# Patient Record
Sex: FEMALE | Race: OTHER | Employment: UNEMPLOYED | URBAN - METROPOLITAN AREA
[De-identification: names, ages, dates, MRNs, and addresses within clinical notes are randomized per-mention and may not be internally consistent; named-entity substitution may affect disease eponyms.]

---

## 2018-11-21 ENCOUNTER — HOSPITAL ENCOUNTER (OUTPATIENT)
Dept: LAB | Facility: MEDICAL CENTER | Age: 58
End: 2018-11-21
Attending: FAMILY MEDICINE
Payer: COMMERCIAL

## 2018-11-21 LAB
ALBUMIN SERPL BCP-MCNC: 3.8 G/DL (ref 3.2–4.9)
ALBUMIN/GLOB SERPL: 1 G/DL
ALP SERPL-CCNC: 83 U/L (ref 30–99)
ALT SERPL-CCNC: 16 U/L (ref 2–50)
ANION GAP SERPL CALC-SCNC: 9 MMOL/L (ref 0–11.9)
ANISOCYTOSIS BLD QL SMEAR: ABNORMAL
AST SERPL-CCNC: 16 U/L (ref 12–45)
BASOPHILS # BLD AUTO: 1.4 % (ref 0–1.8)
BASOPHILS # BLD: 0.08 K/UL (ref 0–0.12)
BILIRUB SERPL-MCNC: 0.3 MG/DL (ref 0.1–1.5)
BUN SERPL-MCNC: 9 MG/DL (ref 8–22)
CALCIUM SERPL-MCNC: 9.6 MG/DL (ref 8.5–10.5)
CHLORIDE SERPL-SCNC: 104 MMOL/L (ref 96–112)
CHOLEST SERPL-MCNC: 188 MG/DL (ref 100–199)
CO2 SERPL-SCNC: 24 MMOL/L (ref 20–33)
COMMENT 1642: NORMAL
CREAT SERPL-MCNC: 0.65 MG/DL (ref 0.5–1.4)
EOSINOPHIL # BLD AUTO: 0.25 K/UL (ref 0–0.51)
EOSINOPHIL NFR BLD: 4.4 % (ref 0–6.9)
ERYTHROCYTE [DISTWIDTH] IN BLOOD BY AUTOMATED COUNT: 50 FL (ref 35.9–50)
FASTING STATUS PATIENT QL REPORTED: NORMAL
GLOBULIN SER CALC-MCNC: 3.7 G/DL (ref 1.9–3.5)
GLUCOSE SERPL-MCNC: 79 MG/DL (ref 65–99)
HCT VFR BLD AUTO: 30.3 % (ref 37–47)
HDLC SERPL-MCNC: 52 MG/DL
HGB BLD-MCNC: 8.5 G/DL (ref 12–16)
HYPOCHROMIA BLD QL SMEAR: ABNORMAL
IMM GRANULOCYTES # BLD AUTO: 0.02 K/UL (ref 0–0.11)
IMM GRANULOCYTES NFR BLD AUTO: 0.3 % (ref 0–0.9)
LDLC SERPL CALC-MCNC: 111 MG/DL
LYMPHOCYTES # BLD AUTO: 1.17 K/UL (ref 1–4.8)
LYMPHOCYTES NFR BLD: 20.4 % (ref 22–41)
MCH RBC QN AUTO: 19.5 PG (ref 27–33)
MCHC RBC AUTO-ENTMCNC: 28.1 G/DL (ref 33.6–35)
MCV RBC AUTO: 69.7 FL (ref 81.4–97.8)
MICROCYTES BLD QL SMEAR: ABNORMAL
MONOCYTES # BLD AUTO: 0.45 K/UL (ref 0–0.85)
MONOCYTES NFR BLD AUTO: 7.9 % (ref 0–13.4)
MORPHOLOGY BLD-IMP: NORMAL
NEUTROPHILS # BLD AUTO: 3.76 K/UL (ref 2–7.15)
NEUTROPHILS NFR BLD: 65.6 % (ref 44–72)
NRBC # BLD AUTO: 0 K/UL
NRBC BLD-RTO: 0 /100 WBC
PLATELET # BLD AUTO: 536 K/UL (ref 164–446)
PLATELET BLD QL SMEAR: NORMAL
PMV BLD AUTO: 9.9 FL (ref 9–12.9)
POIKILOCYTOSIS BLD QL SMEAR: NORMAL
POTASSIUM SERPL-SCNC: 4 MMOL/L (ref 3.6–5.5)
PROT SERPL-MCNC: 7.5 G/DL (ref 6–8.2)
RBC # BLD AUTO: 4.35 M/UL (ref 4.2–5.4)
RBC BLD AUTO: PRESENT
SODIUM SERPL-SCNC: 137 MMOL/L (ref 135–145)
TARGETS BLD QL SMEAR: NORMAL
TRIGL SERPL-MCNC: 126 MG/DL (ref 0–149)
TSH SERPL DL<=0.005 MIU/L-ACNC: 5.44 UIU/ML (ref 0.38–5.33)
WBC # BLD AUTO: 5.7 K/UL (ref 4.8–10.8)

## 2018-11-21 PROCEDURE — 85025 COMPLETE CBC W/AUTO DIFF WBC: CPT

## 2018-11-21 PROCEDURE — 36415 COLL VENOUS BLD VENIPUNCTURE: CPT

## 2018-11-21 PROCEDURE — 84443 ASSAY THYROID STIM HORMONE: CPT

## 2018-11-21 PROCEDURE — 80053 COMPREHEN METABOLIC PANEL: CPT

## 2018-11-21 PROCEDURE — 80061 LIPID PANEL: CPT

## 2018-12-03 ENCOUNTER — HOSPITAL ENCOUNTER (OUTPATIENT)
Dept: LAB | Facility: MEDICAL CENTER | Age: 58
End: 2018-12-03
Attending: FAMILY MEDICINE
Payer: COMMERCIAL

## 2018-12-03 LAB
HGB RETIC QN AUTO: 19.2 PG/CELL (ref 29–35)
IMM RETICS NFR: 26.8 % (ref 9.3–17.4)
RETICS # AUTO: 0.07 M/UL (ref 0.04–0.06)
RETICS/RBC NFR: 1.6 % (ref 0.8–2.1)

## 2018-12-03 PROCEDURE — 83550 IRON BINDING TEST: CPT

## 2018-12-03 PROCEDURE — 85046 RETICYTE/HGB CONCENTRATE: CPT

## 2018-12-03 PROCEDURE — 36415 COLL VENOUS BLD VENIPUNCTURE: CPT

## 2018-12-03 PROCEDURE — 83540 ASSAY OF IRON: CPT

## 2018-12-04 LAB
IRON SATN MFR SERPL: 3 % (ref 15–55)
IRON SERPL-MCNC: 17 UG/DL (ref 40–170)
TIBC SERPL-MCNC: 487 UG/DL (ref 250–450)

## 2019-01-03 ENCOUNTER — HOSPITAL ENCOUNTER (OUTPATIENT)
Dept: LAB | Facility: MEDICAL CENTER | Age: 59
End: 2019-01-03
Attending: NURSE PRACTITIONER
Payer: COMMERCIAL

## 2019-01-03 LAB
ANISOCYTOSIS BLD QL SMEAR: ABNORMAL
BASOPHILS # BLD AUTO: 0.5 % (ref 0–1.8)
BASOPHILS # BLD: 0.04 K/UL (ref 0–0.12)
COMMENT 1642: NORMAL
EOSINOPHIL # BLD AUTO: 0.02 K/UL (ref 0–0.51)
EOSINOPHIL NFR BLD: 0.2 % (ref 0–6.9)
ERYTHROCYTE [DISTWIDTH] IN BLOOD BY AUTOMATED COUNT: 62.6 FL (ref 35.9–50)
HCT VFR BLD AUTO: 34.2 % (ref 37–47)
HGB BLD-MCNC: 10.1 G/DL (ref 12–16)
HYPOCHROMIA BLD QL SMEAR: ABNORMAL
IMM GRANULOCYTES # BLD AUTO: 0.01 K/UL (ref 0–0.11)
IMM GRANULOCYTES NFR BLD AUTO: 0.1 % (ref 0–0.9)
LG PLATELETS BLD QL SMEAR: NORMAL
LYMPHOCYTES # BLD AUTO: 1.19 K/UL (ref 1–4.8)
LYMPHOCYTES NFR BLD: 14.1 % (ref 22–41)
MCH RBC QN AUTO: 22.1 PG (ref 27–33)
MCHC RBC AUTO-ENTMCNC: 29.5 G/DL (ref 33.6–35)
MCV RBC AUTO: 75 FL (ref 81.4–97.8)
MICROCYTES BLD QL SMEAR: ABNORMAL
MONOCYTES # BLD AUTO: 0.25 K/UL (ref 0–0.85)
MONOCYTES NFR BLD AUTO: 3 % (ref 0–13.4)
MORPHOLOGY BLD-IMP: NORMAL
NEUTROPHILS # BLD AUTO: 6.93 K/UL (ref 2–7.15)
NEUTROPHILS NFR BLD: 82.1 % (ref 44–72)
NRBC # BLD AUTO: 0 K/UL
NRBC BLD-RTO: 0 /100 WBC
PLATELET # BLD AUTO: 427 K/UL (ref 164–446)
PLATELET BLD QL SMEAR: NORMAL
PMV BLD AUTO: 10.7 FL (ref 9–12.9)
RBC # BLD AUTO: 4.56 M/UL (ref 4.2–5.4)
RBC BLD AUTO: PRESENT
WBC # BLD AUTO: 8.4 K/UL (ref 4.8–10.8)

## 2019-01-03 PROCEDURE — 85025 COMPLETE CBC W/AUTO DIFF WBC: CPT

## 2019-01-03 PROCEDURE — 36415 COLL VENOUS BLD VENIPUNCTURE: CPT

## 2019-04-11 ENCOUNTER — OFFICE VISIT (OUTPATIENT)
Dept: URGENT CARE | Facility: PHYSICIAN GROUP | Age: 59
End: 2019-04-11
Payer: COMMERCIAL

## 2019-04-11 VITALS
DIASTOLIC BLOOD PRESSURE: 100 MMHG | TEMPERATURE: 100.3 F | HEART RATE: 102 BPM | WEIGHT: 160 LBS | BODY MASS INDEX: 30.23 KG/M2 | RESPIRATION RATE: 16 BRPM | SYSTOLIC BLOOD PRESSURE: 170 MMHG | OXYGEN SATURATION: 97 %

## 2019-04-11 DIAGNOSIS — T78.40XA ALLERGIC REACTION, INITIAL ENCOUNTER: ICD-10-CM

## 2019-04-11 DIAGNOSIS — R21 RASH: ICD-10-CM

## 2019-04-11 PROCEDURE — 99203 OFFICE O/P NEW LOW 30 MIN: CPT | Performed by: PHYSICIAN ASSISTANT

## 2019-04-11 RX ORDER — PREDNISONE 20 MG/1
20 TABLET ORAL 3 TIMES DAILY
Qty: 9 TAB | Refills: 0 | Status: SHIPPED | OUTPATIENT
Start: 2019-04-11 | End: 2019-04-14

## 2019-04-11 RX ORDER — ESOMEPRAZOLE MAGNESIUM 40 MG/1
40 CAPSULE, DELAYED RELEASE ORAL
Qty: 30 CAP | Refills: 1 | Status: SHIPPED | OUTPATIENT
Start: 2019-04-11 | End: 2021-06-11

## 2019-04-11 RX ORDER — DIPHENHYDRAMINE HCL 25 MG
25 CAPSULE ORAL ONCE
Status: COMPLETED | OUTPATIENT
Start: 2019-04-11 | End: 2019-04-11

## 2019-04-11 RX ORDER — LISINOPRIL 20 MG/1
20 TABLET ORAL DAILY
COMMUNITY
End: 2021-06-24

## 2019-04-11 RX ORDER — PREGABALIN 75 MG/1
75 CAPSULE ORAL 3 TIMES DAILY
COMMUNITY
End: 2021-06-24

## 2019-04-11 RX ORDER — LEVOTHYROXINE SODIUM 0.03 MG/1
25 TABLET ORAL
COMMUNITY

## 2019-04-11 RX ORDER — PREDNISONE 20 MG/1
20 TABLET ORAL ONCE
Status: COMPLETED | OUTPATIENT
Start: 2019-04-11 | End: 2019-04-11

## 2019-04-11 RX ADMIN — PREDNISONE 20 MG: 20 TABLET ORAL at 18:50

## 2019-04-11 RX ADMIN — Medication 25 MG: at 18:50

## 2019-04-12 NOTE — PATIENT INSTRUCTIONS
Take medications for hives as follows:    Prednisone 20mg tabs: take one tab by mouth 3 times a day x 3 days.     Benadryl 25mg tabs: take 1-2 tabs by mouth 3 times a day x 3 days.      Zyrtec (cetirazine) : take 1 tab daily x 3 days

## 2019-04-12 NOTE — PROGRESS NOTES
Subjective:      Sushma Kumari Chawla is a 58 y.o. female who presents with Facial Swelling (constipation  ate pizza then got heartburn,next day got a fever and sweling oin face,headache,lightheaded,today rest of face swelled up)    PMH:  has a past medical history of Arthritis; GERD (gastroesophageal reflux disease); and Hypertension.  MEDS:   Current Outpatient Prescriptions:   •  levothyroxine (SYNTHROID) 25 MCG Tab, Take 25 mcg by mouth Every morning on an empty stomach., Disp: , Rfl:   •  lisinopril (PRINIVIL) 20 MG Tab, Take 20 mg by mouth every day., Disp: , Rfl:   •  esomeprazole (NEXIUM) 20 MG capsule, Take 20 mg by mouth every morning before breakfast., Disp: , Rfl:   •  pregabalin (LYRICA) 75 MG Cap, Take 75 mg by mouth 3 times a day., Disp: , Rfl:   •  esomeprazole (NEXIUM) 40 MG delayed-release capsule, Take 1 Cap by mouth every morning before breakfast., Disp: 30 Cap, Rfl: 1  •  pantoprazole (PROTONIX) 40 MG PACK, 40 mg by Nasogastric route every day., Disp: , Rfl:   •  verapamil (COVERA HS) 180 MG (CO) CR tablet, Take 180 mg by mouth every day., Disp: , Rfl:   •  famotidine (PEPCID) 40 MG TABS, Take 40 mg by mouth 2 times a day., Disp: , Rfl:   •  hydroxychloroquine (PLAQUENIL) 200 MG TABS, Take 400 mg by mouth every day., Disp: , Rfl:   ALLERGIES:   Allergies   Allergen Reactions   • Atenolol    • Darvon [Propoxyphene Hcl]      SURGHX: History reviewed. No pertinent surgical history.  SOCHX:  reports that she has never smoked. She has never used smokeless tobacco. She reports that she does not drink alcohol or use drugs.  FH: Reviewed with patient, not pertinent to this visit.           Patient presents with:  Facial Swellin ate pizza then got heartburn,next day got a fever and swelling oin face,headache,lightheaded,today rest of face swelled up. Pt denies shortness of breath, wheezing, swelling to tongue or throat.  Pt has never had reaction to pizza before but cant think of any other  reason for rash.  Pt has taken some otc benadryl with some relief prior to arrival.           Rash   This is a new problem. Episode onset: 2 days. The problem has been waxing and waning since onset. The affected locations include the face and neck. The rash is characterized by burning, itchiness, redness and swelling. Associated with: unsure  Pertinent negatives include no congestion, cough, fever, rhinorrhea or shortness of breath. Past treatments include antihistamine. The treatment provided mild relief. There is no history of eczema.       Review of Systems   Constitutional: Negative for chills and fever.   HENT: Negative for congestion and rhinorrhea.    Respiratory: Negative for cough, shortness of breath, wheezing and stridor.    Skin: Positive for rash.   All other systems reviewed and are negative.         Objective:     There were no vitals taken for this visit.     Physical Exam   Constitutional: She is oriented to person, place, and time. She appears well-developed and well-nourished. No distress.   HENT:   Head: Normocephalic.       Nose: Nose normal.   Mouth/Throat: Oropharynx is clear and moist.   Eyes: Pupils are equal, round, and reactive to light. Conjunctivae and EOM are normal.   Neck: Normal range of motion. Neck supple.   Cardiovascular: Normal rate, regular rhythm and normal heart sounds.    Pulmonary/Chest: Effort normal and breath sounds normal.   Musculoskeletal: Normal range of motion.   Neurological: She is alert and oriented to person, place, and time. Gait normal.   Skin: Skin is warm and dry. Capillary refill takes less than 2 seconds. Rash noted.   Psychiatric: She has a normal mood and affect.   Nursing note and vitals reviewed.              Assessment/Plan:     1. Rash  esomeprazole (NEXIUM) 40 MG delayed-release capsule    predniSONE (DELTASONE) 20 MG Tab    diphenhydrAMINE (BENADRYL) capsule 25 mg    predniSONE (DELTASONE) tablet 20 mg   2. Allergic reaction, initial encounter   esomeprazole (NEXIUM) 40 MG delayed-release capsule    predniSONE (DELTASONE) 20 MG Tab    diphenhydrAMINE (BENADRYL) capsule 25 mg    predniSONE (DELTASONE) tablet 20 mg     Pt encouraged to continue otc medications until visit with pcp in 3 days.     PT to continue taking prescription medications as prescribed.      PT should follow up with PCP in 1-2 days for re-evaluation if symptoms have not improved.  Discussed red flags and reasons to return to UC or ED.  Pt and/or family verbalized understanding of diagnosis and follow up instructions and was offered informational handout on diagnosis.  PT discharged.

## 2019-04-16 ASSESSMENT — ENCOUNTER SYMPTOMS
SHORTNESS OF BREATH: 0
WHEEZING: 0
STRIDOR: 0
CHILLS: 0
RHINORRHEA: 0
COUGH: 0
FEVER: 0

## 2020-09-19 ENCOUNTER — HOSPITAL ENCOUNTER (OUTPATIENT)
Dept: LAB | Facility: MEDICAL CENTER | Age: 60
End: 2020-09-19
Attending: FAMILY MEDICINE
Payer: COMMERCIAL

## 2020-09-19 LAB
ALBUMIN SERPL BCP-MCNC: 3.9 G/DL (ref 3.2–4.9)
ALBUMIN/GLOB SERPL: 1.2 G/DL
ALP SERPL-CCNC: 110 U/L (ref 30–99)
ALT SERPL-CCNC: 32 U/L (ref 2–50)
ANION GAP SERPL CALC-SCNC: 13 MMOL/L (ref 7–16)
AST SERPL-CCNC: 31 U/L (ref 12–45)
BILIRUB SERPL-MCNC: 0.2 MG/DL (ref 0.1–1.5)
BUN SERPL-MCNC: 10 MG/DL (ref 8–22)
CALCIUM SERPL-MCNC: 9.6 MG/DL (ref 8.5–10.5)
CHLORIDE SERPL-SCNC: 100 MMOL/L (ref 96–112)
CHOLEST SERPL-MCNC: 220 MG/DL (ref 100–199)
CO2 SERPL-SCNC: 22 MMOL/L (ref 20–33)
CREAT SERPL-MCNC: 0.68 MG/DL (ref 0.5–1.4)
GLOBULIN SER CALC-MCNC: 3.2 G/DL (ref 1.9–3.5)
GLUCOSE SERPL-MCNC: 100 MG/DL (ref 65–99)
HDLC SERPL-MCNC: 45 MG/DL
LDLC SERPL CALC-MCNC: 135 MG/DL
POTASSIUM SERPL-SCNC: 3.8 MMOL/L (ref 3.6–5.5)
PROT SERPL-MCNC: 7.1 G/DL (ref 6–8.2)
SODIUM SERPL-SCNC: 135 MMOL/L (ref 135–145)
T4 FREE SERPL-MCNC: 1.2 NG/DL (ref 0.93–1.7)
TRIGL SERPL-MCNC: 200 MG/DL (ref 0–149)
TSH SERPL DL<=0.005 MIU/L-ACNC: 5.44 UIU/ML (ref 0.38–5.33)

## 2020-09-19 PROCEDURE — 36415 COLL VENOUS BLD VENIPUNCTURE: CPT

## 2020-09-19 PROCEDURE — 80061 LIPID PANEL: CPT

## 2020-09-19 PROCEDURE — 84439 ASSAY OF FREE THYROXINE: CPT

## 2020-09-19 PROCEDURE — 80053 COMPREHEN METABOLIC PANEL: CPT

## 2020-09-19 PROCEDURE — 84443 ASSAY THYROID STIM HORMONE: CPT

## 2021-03-15 DIAGNOSIS — Z23 NEED FOR VACCINATION: ICD-10-CM

## 2021-06-11 ENCOUNTER — OFFICE VISIT (OUTPATIENT)
Dept: URGENT CARE | Facility: PHYSICIAN GROUP | Age: 61
End: 2021-06-11

## 2021-06-11 VITALS
TEMPERATURE: 99 F | BODY MASS INDEX: 30.4 KG/M2 | RESPIRATION RATE: 18 BRPM | OXYGEN SATURATION: 100 % | SYSTOLIC BLOOD PRESSURE: 182 MMHG | WEIGHT: 161 LBS | HEIGHT: 61 IN | HEART RATE: 88 BPM | DIASTOLIC BLOOD PRESSURE: 82 MMHG

## 2021-06-11 DIAGNOSIS — R11.2 NAUSEA AND VOMITING, INTRACTABILITY OF VOMITING NOT SPECIFIED, UNSPECIFIED VOMITING TYPE: ICD-10-CM

## 2021-06-11 DIAGNOSIS — R03.0 ELEVATED BLOOD PRESSURE READING: Primary | ICD-10-CM

## 2021-06-11 DIAGNOSIS — K52.9 GASTROENTERITIS: ICD-10-CM

## 2021-06-11 PROCEDURE — 99214 OFFICE O/P EST MOD 30 MIN: CPT | Performed by: PHYSICIAN ASSISTANT

## 2021-06-11 RX ORDER — ONDANSETRON 4 MG/1
4 TABLET, FILM COATED ORAL EVERY 4 HOURS PRN
Qty: 20 TABLET | Refills: 0 | Status: SHIPPED | OUTPATIENT
Start: 2021-06-11 | End: 2021-06-16

## 2021-06-11 RX ORDER — ONDANSETRON 4 MG/1
4 TABLET, ORALLY DISINTEGRATING ORAL ONCE
Status: COMPLETED | OUTPATIENT
Start: 2021-06-11 | End: 2021-06-11

## 2021-06-11 RX ADMIN — ONDANSETRON 4 MG: 4 TABLET, ORALLY DISINTEGRATING ORAL at 18:20

## 2021-06-11 ASSESSMENT — ENCOUNTER SYMPTOMS
DIARRHEA: 0
VOMITING: 1
MYALGIAS: 0
FEVER: 0
ABDOMINAL PAIN: 1
CONSTIPATION: 1

## 2021-06-11 ASSESSMENT — CROHNS DISEASE ACTIVITY INDEX (CDAI): CDAI SCORE: 0

## 2021-06-12 NOTE — PROGRESS NOTES
Subjective:   Sushma Kumari Chawla is a 60 y.o. female who presents for Abdominal Pain (vomiting, cant keep anything down. abdominal pain. )        Abdominal Pain  This is a new problem. The current episode started yesterday. The onset quality is sudden. The problem occurs constantly. The pain is located in the epigastric region. The quality of the pain is burning. The abdominal pain does not radiate. Associated symptoms include constipation and vomiting (x 10 episodes ). Pertinent negatives include no diarrhea, fever, melena or myalgias. Treatments tried: peptobismol  Her past medical history is significant for GERD. There is no history of abdominal surgery, Crohn's disease, pancreatitis or ulcerative colitis.       No ill contacts. No recent abx, hospitalizations, travel. No suspicious food intake.    Review of Systems   Constitutional: Negative for fever.   Gastrointestinal: Positive for abdominal pain, constipation and vomiting (x 10 episodes ). Negative for diarrhea and melena.   Musculoskeletal: Negative for myalgias.       PMH:  has a past medical history of Arthritis, GERD (gastroesophageal reflux disease), and Hypertension.  MEDS:   Current Outpatient Medications:   •  methotrexate 2.5 MG Tab, Take 7.5 mg by mouth every 7 days., Disp: , Rfl:   •  ondansetron (ZOFRAN) 4 MG Tab tablet, Take 1 tablet by mouth every four hours as needed for Nausea/Vomiting for up to 5 days., Disp: 20 tablet, Rfl: 0  •  levothyroxine (SYNTHROID) 25 MCG Tab, Take 25 mcg by mouth Every morning on an empty stomach., Disp: , Rfl:   •  lisinopril (PRINIVIL) 20 MG Tab, Take 20 mg by mouth every day., Disp: , Rfl:   •  esomeprazole (NEXIUM) 20 MG capsule, Take 20 mg by mouth every morning before breakfast., Disp: , Rfl:   •  pregabalin (LYRICA) 75 MG Cap, Take 75 mg by mouth 3 times a day., Disp: , Rfl:   •  verapamil (COVERA HS) 180 MG (CO) CR tablet, Take 180 mg by mouth every day., Disp: , Rfl:   •  famotidine (PEPCID) 40 MG TABS,  "Take 40 mg by mouth 2 times a day., Disp: , Rfl:   •  hydroxychloroquine (PLAQUENIL) 200 MG TABS, Take 400 mg by mouth every day., Disp: , Rfl:   ALLERGIES:   Allergies   Allergen Reactions   • Atenolol    • Darvon [Propoxyphene Hcl]      SURGHX: History reviewed. No pertinent surgical history.  SOCHX:  reports that she has never smoked. She has never used smokeless tobacco. She reports that she does not drink alcohol and does not use drugs.  History reviewed. No pertinent family history.     Objective:   BP (!) 182/82   Pulse 88   Temp 37.2 °C (99 °F) (Temporal)   Resp 18   Ht 1.549 m (5' 1\")   Wt 73 kg (161 lb)   SpO2 100%   BMI 30.42 kg/m²     Physical Exam  Vitals reviewed.   Constitutional:       General: She is not in acute distress.     Appearance: Normal appearance. She is well-developed. She is not ill-appearing.   HENT:      Head: Normocephalic and atraumatic.   Neck:      Trachea: No tracheal deviation.   Pulmonary:      Effort: Pulmonary effort is normal.   Abdominal:      General: There is no distension.      Palpations: Abdomen is soft.      Tenderness: There is no abdominal tenderness. There is no right CVA tenderness or left CVA tenderness.   Skin:     General: Skin is warm and dry.      Capillary Refill: Capillary refill takes less than 2 seconds.   Neurological:      Mental Status: She is alert and oriented to person, place, and time.   Psychiatric:         Mood and Affect: Mood normal.         Behavior: Behavior normal.           Assessment/Plan:     1. Elevated blood pressure reading  AMB REFERRAL TO ESTABLISH WITH [facility]CP   2. Nausea and vomiting, intractability of vomiting not specified, unspecified vomiting type  ondansetron (ZOFRAN ODT) dispertab 4 mg    ondansetron (ZOFRAN) 4 MG Tab tablet   3. Gastroenteritis       Differential diagnosis, natural history, supportive care discussed. Pt directed to start bland diet. Continue to push fluids.    Follow-up with primary care provider " within 7-10 days, referral placed.  If symptoms worsen or persist patient can return to clinic for reevaluation.  Red flags and STRICT emergency room precautions discussed. Side effects of medication discussed. Patient confirmed understanding of information.    Please note that this dictation was created using voice recognition software. I have made every reasonable attempt to correct obvious errors, but I expect that there are errors of grammar and possibly content that I did not discover before finalizing the note.

## 2021-06-24 ENCOUNTER — HOSPITAL ENCOUNTER (INPATIENT)
Facility: MEDICAL CENTER | Age: 61
LOS: 3 days | DRG: 419 | End: 2021-06-27
Attending: EMERGENCY MEDICINE | Admitting: HOSPITALIST

## 2021-06-24 ENCOUNTER — HOSPITAL ENCOUNTER (OUTPATIENT)
Dept: RADIOLOGY | Facility: MEDICAL CENTER | Age: 61
End: 2021-06-24
Attending: FAMILY MEDICINE

## 2021-06-24 DIAGNOSIS — K83.1 EXTRAHEPATIC OBSTRUCTIVE BILIARY DISEASE: ICD-10-CM

## 2021-06-24 DIAGNOSIS — R10.9 ABDOMINAL PAIN, UNSPECIFIED ABDOMINAL LOCATION: ICD-10-CM

## 2021-06-24 DIAGNOSIS — R10.13 EPIGASTRIC PAIN: ICD-10-CM

## 2021-06-24 PROBLEM — M19.90 ARTHRITIS: Status: ACTIVE | Noted: 2021-06-24

## 2021-06-24 PROBLEM — I10 HTN (HYPERTENSION): Status: ACTIVE | Noted: 2021-06-24

## 2021-06-24 PROBLEM — K81.9 CHOLECYSTITIS: Status: ACTIVE | Noted: 2021-06-24

## 2021-06-24 LAB
ALBUMIN SERPL BCP-MCNC: 3.8 G/DL (ref 3.2–4.9)
ALBUMIN/GLOB SERPL: 1 G/DL
ALP SERPL-CCNC: 149 U/L (ref 30–99)
ALT SERPL-CCNC: 21 U/L (ref 2–50)
ANION GAP SERPL CALC-SCNC: 11 MMOL/L (ref 7–16)
ANISOCYTOSIS BLD QL SMEAR: ABNORMAL
AST SERPL-CCNC: 28 U/L (ref 12–45)
BASOPHILS # BLD AUTO: 0.8 % (ref 0–1.8)
BASOPHILS # BLD: 0.04 K/UL (ref 0–0.12)
BILIRUB SERPL-MCNC: 0.2 MG/DL (ref 0.1–1.5)
BUN SERPL-MCNC: 4 MG/DL (ref 8–22)
CALCIUM SERPL-MCNC: 9.7 MG/DL (ref 8.5–10.5)
CHLORIDE SERPL-SCNC: 107 MMOL/L (ref 96–112)
CO2 SERPL-SCNC: 23 MMOL/L (ref 20–33)
COMMENT 1642: NORMAL
CREAT SERPL-MCNC: 0.72 MG/DL (ref 0.5–1.4)
EOSINOPHIL # BLD AUTO: 0.04 K/UL (ref 0–0.51)
EOSINOPHIL NFR BLD: 0.8 % (ref 0–6.9)
ERYTHROCYTE [DISTWIDTH] IN BLOOD BY AUTOMATED COUNT: 56.9 FL (ref 35.9–50)
GLOBULIN SER CALC-MCNC: 4 G/DL (ref 1.9–3.5)
GLUCOSE SERPL-MCNC: 124 MG/DL (ref 65–99)
HCT VFR BLD AUTO: 35.6 % (ref 37–47)
HGB BLD-MCNC: 10.4 G/DL (ref 12–16)
IMM GRANULOCYTES # BLD AUTO: 0.03 K/UL (ref 0–0.11)
IMM GRANULOCYTES NFR BLD AUTO: 0.6 % (ref 0–0.9)
LIPASE SERPL-CCNC: 32 U/L (ref 11–82)
LYMPHOCYTES # BLD AUTO: 1.09 K/UL (ref 1–4.8)
LYMPHOCYTES NFR BLD: 22 % (ref 22–41)
MACROCYTES BLD QL SMEAR: ABNORMAL
MAGNESIUM SERPL-MCNC: 2.4 MG/DL (ref 1.5–2.5)
MCH RBC QN AUTO: 22.5 PG (ref 27–33)
MCHC RBC AUTO-ENTMCNC: 29.2 G/DL (ref 33.6–35)
MCV RBC AUTO: 77.1 FL (ref 81.4–97.8)
MONOCYTES # BLD AUTO: 0.33 K/UL (ref 0–0.85)
MONOCYTES NFR BLD AUTO: 6.7 % (ref 0–13.4)
MORPHOLOGY BLD-IMP: NORMAL
NEUTROPHILS # BLD AUTO: 3.42 K/UL (ref 2–7.15)
NEUTROPHILS NFR BLD: 69.1 % (ref 44–72)
NRBC # BLD AUTO: 0 K/UL
NRBC BLD-RTO: 0 /100 WBC
PLATELET # BLD AUTO: 555 K/UL (ref 164–446)
PLATELET BLD QL SMEAR: NORMAL
PMV BLD AUTO: 10.2 FL (ref 9–12.9)
POTASSIUM SERPL-SCNC: 4.1 MMOL/L (ref 3.6–5.5)
PROT SERPL-MCNC: 7.8 G/DL (ref 6–8.2)
RBC # BLD AUTO: 4.62 M/UL (ref 4.2–5.4)
RBC BLD AUTO: PRESENT
SODIUM SERPL-SCNC: 141 MMOL/L (ref 135–145)
WBC # BLD AUTO: 5 K/UL (ref 4.8–10.8)

## 2021-06-24 PROCEDURE — 99285 EMERGENCY DEPT VISIT HI MDM: CPT

## 2021-06-24 PROCEDURE — 36415 COLL VENOUS BLD VENIPUNCTURE: CPT

## 2021-06-24 PROCEDURE — 83735 ASSAY OF MAGNESIUM: CPT

## 2021-06-24 PROCEDURE — 700105 HCHG RX REV CODE 258: Performed by: HOSPITALIST

## 2021-06-24 PROCEDURE — 99223 1ST HOSP IP/OBS HIGH 75: CPT | Performed by: HOSPITALIST

## 2021-06-24 PROCEDURE — U0005 INFEC AGEN DETEC AMPLI PROBE: HCPCS

## 2021-06-24 PROCEDURE — 700111 HCHG RX REV CODE 636 W/ 250 OVERRIDE (IP): Performed by: HOSPITALIST

## 2021-06-24 PROCEDURE — U0003 INFECTIOUS AGENT DETECTION BY NUCLEIC ACID (DNA OR RNA); SEVERE ACUTE RESPIRATORY SYNDROME CORONAVIRUS 2 (SARS-COV-2) (CORONAVIRUS DISEASE [COVID-19]), AMPLIFIED PROBE TECHNIQUE, MAKING USE OF HIGH THROUGHPUT TECHNOLOGIES AS DESCRIBED BY CMS-2020-01-R: HCPCS

## 2021-06-24 PROCEDURE — A9270 NON-COVERED ITEM OR SERVICE: HCPCS | Performed by: HOSPITALIST

## 2021-06-24 PROCEDURE — 96374 THER/PROPH/DIAG INJ IV PUSH: CPT

## 2021-06-24 PROCEDURE — 85025 COMPLETE CBC W/AUTO DIFF WBC: CPT

## 2021-06-24 PROCEDURE — 700102 HCHG RX REV CODE 250 W/ 637 OVERRIDE(OP): Performed by: HOSPITALIST

## 2021-06-24 PROCEDURE — C9803 HOPD COVID-19 SPEC COLLECT: HCPCS | Performed by: EMERGENCY MEDICINE

## 2021-06-24 PROCEDURE — 83690 ASSAY OF LIPASE: CPT

## 2021-06-24 PROCEDURE — 76700 US EXAM ABDOM COMPLETE: CPT

## 2021-06-24 PROCEDURE — 80053 COMPREHEN METABOLIC PANEL: CPT

## 2021-06-24 PROCEDURE — 770001 HCHG ROOM/CARE - MED/SURG/GYN PRIV*

## 2021-06-24 RX ORDER — PROMETHAZINE HYDROCHLORIDE 25 MG/1
12.5-25 SUPPOSITORY RECTAL EVERY 4 HOURS PRN
Status: DISCONTINUED | OUTPATIENT
Start: 2021-06-24 | End: 2021-06-27 | Stop reason: HOSPADM

## 2021-06-24 RX ORDER — OXYCODONE HYDROCHLORIDE 5 MG/1
5 TABLET ORAL
Status: DISCONTINUED | OUTPATIENT
Start: 2021-06-24 | End: 2021-06-27 | Stop reason: HOSPADM

## 2021-06-24 RX ORDER — NAPROXEN SODIUM 220 MG
440 TABLET ORAL
COMMUNITY

## 2021-06-24 RX ORDER — AMLODIPINE BESYLATE 10 MG/1
5 TABLET ORAL
Status: DISCONTINUED | OUTPATIENT
Start: 2021-06-24 | End: 2021-06-27 | Stop reason: HOSPADM

## 2021-06-24 RX ORDER — AMLODIPINE BESYLATE 5 MG/1
5 TABLET ORAL DAILY
COMMUNITY
Start: 2021-06-21

## 2021-06-24 RX ORDER — OMEPRAZOLE 20 MG/1
20 CAPSULE, DELAYED RELEASE ORAL DAILY
Status: DISCONTINUED | OUTPATIENT
Start: 2021-06-25 | End: 2021-06-27 | Stop reason: HOSPADM

## 2021-06-24 RX ORDER — PREDNISONE 5 MG/1
5 TABLET ORAL DAILY
COMMUNITY

## 2021-06-24 RX ORDER — OXYCODONE HYDROCHLORIDE 5 MG/1
2.5 TABLET ORAL
Status: DISCONTINUED | OUTPATIENT
Start: 2021-06-24 | End: 2021-06-27 | Stop reason: HOSPADM

## 2021-06-24 RX ORDER — MORPHINE SULFATE 4 MG/ML
2 INJECTION, SOLUTION INTRAMUSCULAR; INTRAVENOUS
Status: DISCONTINUED | OUTPATIENT
Start: 2021-06-24 | End: 2021-06-27 | Stop reason: HOSPADM

## 2021-06-24 RX ORDER — LEVOTHYROXINE SODIUM 0.03 MG/1
25 TABLET ORAL
Status: DISCONTINUED | OUTPATIENT
Start: 2021-06-25 | End: 2021-06-27 | Stop reason: HOSPADM

## 2021-06-24 RX ORDER — PANTOPRAZOLE SODIUM 40 MG/1
40 TABLET, DELAYED RELEASE ORAL DAILY
COMMUNITY
Start: 2021-06-21

## 2021-06-24 RX ORDER — SODIUM CHLORIDE 9 MG/ML
INJECTION, SOLUTION INTRAVENOUS CONTINUOUS
Status: DISCONTINUED | OUTPATIENT
Start: 2021-06-24 | End: 2021-06-27

## 2021-06-24 RX ORDER — ONDANSETRON 4 MG/1
4 TABLET, ORALLY DISINTEGRATING ORAL EVERY 4 HOURS PRN
Status: DISCONTINUED | OUTPATIENT
Start: 2021-06-24 | End: 2021-06-27 | Stop reason: HOSPADM

## 2021-06-24 RX ORDER — SIMETHICONE 80 MG
80 TABLET,CHEWABLE ORAL EVERY 6 HOURS PRN
COMMUNITY

## 2021-06-24 RX ORDER — ONDANSETRON 2 MG/ML
4 INJECTION INTRAMUSCULAR; INTRAVENOUS EVERY 4 HOURS PRN
Status: DISCONTINUED | OUTPATIENT
Start: 2021-06-24 | End: 2021-06-27 | Stop reason: HOSPADM

## 2021-06-24 RX ORDER — LISINOPRIL 20 MG/1
20 TABLET ORAL DAILY
Status: DISCONTINUED | OUTPATIENT
Start: 2021-06-25 | End: 2021-06-27 | Stop reason: HOSPADM

## 2021-06-24 RX ORDER — PROCHLORPERAZINE EDISYLATE 5 MG/ML
5-10 INJECTION INTRAMUSCULAR; INTRAVENOUS EVERY 4 HOURS PRN
Status: DISCONTINUED | OUTPATIENT
Start: 2021-06-24 | End: 2021-06-27 | Stop reason: HOSPADM

## 2021-06-24 RX ORDER — HYDROXYCHLOROQUINE SULFATE 200 MG/1
400 TABLET, FILM COATED ORAL DAILY
Status: DISCONTINUED | OUTPATIENT
Start: 2021-06-25 | End: 2021-06-24

## 2021-06-24 RX ORDER — PANTOPRAZOLE SODIUM 40 MG/1
40 TABLET, DELAYED RELEASE ORAL DAILY
Status: DISCONTINUED | OUTPATIENT
Start: 2021-06-25 | End: 2021-06-24

## 2021-06-24 RX ORDER — PREGABALIN 75 MG/1
75 CAPSULE ORAL 3 TIMES DAILY
Status: DISCONTINUED | OUTPATIENT
Start: 2021-06-24 | End: 2021-06-24

## 2021-06-24 RX ORDER — HYDRALAZINE HYDROCHLORIDE 20 MG/ML
10 INJECTION INTRAMUSCULAR; INTRAVENOUS EVERY 6 HOURS PRN
Status: DISCONTINUED | OUTPATIENT
Start: 2021-06-24 | End: 2021-06-27 | Stop reason: HOSPADM

## 2021-06-24 RX ORDER — PROMETHAZINE HYDROCHLORIDE 25 MG/1
12.5-25 TABLET ORAL EVERY 4 HOURS PRN
Status: DISCONTINUED | OUTPATIENT
Start: 2021-06-24 | End: 2021-06-27 | Stop reason: HOSPADM

## 2021-06-24 RX ADMIN — HYDRALAZINE HYDROCHLORIDE 10 MG: 20 INJECTION INTRAMUSCULAR; INTRAVENOUS at 19:48

## 2021-06-24 RX ADMIN — VERAPAMIL HYDROCHLORIDE 180 MG: 180 TABLET, FILM COATED, EXTENDED RELEASE ORAL at 22:58

## 2021-06-24 RX ADMIN — SODIUM CHLORIDE: 9 INJECTION, SOLUTION INTRAVENOUS at 22:29

## 2021-06-24 RX ADMIN — AMLODIPINE BESYLATE 5 MG: 10 TABLET ORAL at 22:58

## 2021-06-24 ASSESSMENT — LIFESTYLE VARIABLES
TOTAL SCORE: 0
AVERAGE NUMBER OF DAYS PER WEEK YOU HAVE A DRINK CONTAINING ALCOHOL: 0
CONSUMPTION TOTAL: NEGATIVE
EVER FELT BAD OR GUILTY ABOUT YOUR DRINKING: NO
TOTAL SCORE: 0
TOTAL SCORE: 0
HOW MANY TIMES IN THE PAST YEAR HAVE YOU HAD 5 OR MORE DRINKS IN A DAY: 0
HAVE PEOPLE ANNOYED YOU BY CRITICIZING YOUR DRINKING: NO
DOES PATIENT WANT TO STOP DRINKING: NO
HAVE YOU EVER FELT YOU SHOULD CUT DOWN ON YOUR DRINKING: NO
EVER HAD A DRINK FIRST THING IN THE MORNING TO STEADY YOUR NERVES TO GET RID OF A HANGOVER: NO
ON A TYPICAL DAY WHEN YOU DRINK ALCOHOL HOW MANY DRINKS DO YOU HAVE: 0
ALCOHOL_USE: NO

## 2021-06-24 ASSESSMENT — COPD QUESTIONNAIRES
DO YOU EVER COUGH UP ANY MUCUS OR PHLEGM?: NO/ONLY WITH OCCASIONAL COLDS OR INFECTIONS
HAVE YOU SMOKED AT LEAST 100 CIGARETTES IN YOUR ENTIRE LIFE: NO/DON'T KNOW
DURING THE PAST 4 WEEKS HOW MUCH DID YOU FEEL SHORT OF BREATH: NONE/LITTLE OF THE TIME
COPD SCREENING SCORE: 1

## 2021-06-24 ASSESSMENT — PATIENT HEALTH QUESTIONNAIRE - PHQ9
1. LITTLE INTEREST OR PLEASURE IN DOING THINGS: NOT AT ALL
SUM OF ALL RESPONSES TO PHQ9 QUESTIONS 1 AND 2: 0
2. FEELING DOWN, DEPRESSED, IRRITABLE, OR HOPELESS: NOT AT ALL

## 2021-06-24 ASSESSMENT — COGNITIVE AND FUNCTIONAL STATUS - GENERAL
SUGGESTED CMS G CODE MODIFIER MOBILITY: CH
SUGGESTED CMS G CODE MODIFIER DAILY ACTIVITY: CH
DAILY ACTIVITIY SCORE: 24
MOBILITY SCORE: 24

## 2021-06-24 ASSESSMENT — ENCOUNTER SYMPTOMS
BRUISES/BLEEDS EASILY: 0
HEARTBURN: 0
BLURRED VISION: 0
SHORTNESS OF BREATH: 0
HEADACHES: 0
ABDOMINAL PAIN: 1
DEPRESSION: 0
VOMITING: 0
WHEEZING: 0
CLAUDICATION: 0
PALPITATIONS: 0
CHILLS: 0
MYALGIAS: 0
DIARRHEA: 0
PND: 0
HEMOPTYSIS: 0
COUGH: 0
DOUBLE VISION: 0
BACK PAIN: 0
FEVER: 0
DIZZINESS: 0
NAUSEA: 0

## 2021-06-24 ASSESSMENT — FIBROSIS 4 INDEX: FIB4 SCORE: 0.65

## 2021-06-24 NOTE — ED TRIAGE NOTES
Pt ambulated to triage with   Chief Complaint   Patient presents with   • Sent by MD     seen by PCP, u/s completed and found that she has gallbladder disease.    • Abdominal Pain     started 6/10 and has improved since being seen at , pt has been eating very much.       Pt is pain free at this time.  Told by her primary to come into hospital.   Pt Informed regarding triage process and verbalized understanding to inform triage tech or RN for any changes in condition. Placed in lobby.

## 2021-06-25 ENCOUNTER — ANESTHESIA (OUTPATIENT)
Dept: SURGERY | Facility: MEDICAL CENTER | Age: 61
DRG: 419 | End: 2021-06-25

## 2021-06-25 ENCOUNTER — APPOINTMENT (OUTPATIENT)
Dept: RADIOLOGY | Facility: MEDICAL CENTER | Age: 61
DRG: 419 | End: 2021-06-25
Attending: SURGERY

## 2021-06-25 ENCOUNTER — ANESTHESIA EVENT (OUTPATIENT)
Dept: SURGERY | Facility: MEDICAL CENTER | Age: 61
DRG: 419 | End: 2021-06-25

## 2021-06-25 LAB
ALBUMIN SERPL BCP-MCNC: 3.6 G/DL (ref 3.2–4.9)
ALBUMIN/GLOB SERPL: 1 G/DL
ALP SERPL-CCNC: 131 U/L (ref 30–99)
ALT SERPL-CCNC: 18 U/L (ref 2–50)
ANION GAP SERPL CALC-SCNC: 11 MMOL/L (ref 7–16)
AST SERPL-CCNC: 25 U/L (ref 12–45)
BILIRUB SERPL-MCNC: 0.3 MG/DL (ref 0.1–1.5)
BUN SERPL-MCNC: 4 MG/DL (ref 8–22)
CALCIUM SERPL-MCNC: 9.1 MG/DL (ref 8.5–10.5)
CHLORIDE SERPL-SCNC: 109 MMOL/L (ref 96–112)
CO2 SERPL-SCNC: 21 MMOL/L (ref 20–33)
CREAT SERPL-MCNC: 0.57 MG/DL (ref 0.5–1.4)
ERYTHROCYTE [DISTWIDTH] IN BLOOD BY AUTOMATED COUNT: 55.9 FL (ref 35.9–50)
GLOBULIN SER CALC-MCNC: 3.6 G/DL (ref 1.9–3.5)
GLUCOSE SERPL-MCNC: 103 MG/DL (ref 65–99)
HCT VFR BLD AUTO: 34.2 % (ref 37–47)
HGB BLD-MCNC: 10.1 G/DL (ref 12–16)
MCH RBC QN AUTO: 22.4 PG (ref 27–33)
MCHC RBC AUTO-ENTMCNC: 29.5 G/DL (ref 33.6–35)
MCV RBC AUTO: 76 FL (ref 81.4–97.8)
PATHOLOGY CONSULT NOTE: NORMAL
PLATELET # BLD AUTO: 503 K/UL (ref 164–446)
PMV BLD AUTO: 9.9 FL (ref 9–12.9)
POTASSIUM SERPL-SCNC: 3.3 MMOL/L (ref 3.6–5.5)
PROT SERPL-MCNC: 7.2 G/DL (ref 6–8.2)
RBC # BLD AUTO: 4.5 M/UL (ref 4.2–5.4)
SARS-COV-2 RNA RESP QL NAA+PROBE: NOTDETECTED
SODIUM SERPL-SCNC: 141 MMOL/L (ref 135–145)
SPECIMEN SOURCE: NORMAL
WBC # BLD AUTO: 4.8 K/UL (ref 4.8–10.8)

## 2021-06-25 PROCEDURE — 700111 HCHG RX REV CODE 636 W/ 250 OVERRIDE (IP): Performed by: ANESTHESIOLOGY

## 2021-06-25 PROCEDURE — 700117 HCHG RX CONTRAST REV CODE 255: Performed by: SURGERY

## 2021-06-25 PROCEDURE — 160002 HCHG RECOVERY MINUTES (STAT): Performed by: SURGERY

## 2021-06-25 PROCEDURE — 160048 HCHG OR STATISTICAL LEVEL 1-5: Performed by: SURGERY

## 2021-06-25 PROCEDURE — 700101 HCHG RX REV CODE 250: Performed by: ANESTHESIOLOGY

## 2021-06-25 PROCEDURE — 501583 HCHG TROCAR, THRD CAN&SEAL 5X100: Performed by: SURGERY

## 2021-06-25 PROCEDURE — 160035 HCHG PACU - 1ST 60 MINS PHASE I: Performed by: SURGERY

## 2021-06-25 PROCEDURE — 99233 SBSQ HOSP IP/OBS HIGH 50: CPT | Performed by: HOSPITALIST

## 2021-06-25 PROCEDURE — A9270 NON-COVERED ITEM OR SERVICE: HCPCS | Performed by: ANESTHESIOLOGY

## 2021-06-25 PROCEDURE — 700101 HCHG RX REV CODE 250: Performed by: SURGERY

## 2021-06-25 PROCEDURE — 85027 COMPLETE CBC AUTOMATED: CPT

## 2021-06-25 PROCEDURE — 160041 HCHG SURGERY MINUTES - EA ADDL 1 MIN LEVEL 4: Performed by: SURGERY

## 2021-06-25 PROCEDURE — 500512 HCHG ENDO PEANUT: Performed by: SURGERY

## 2021-06-25 PROCEDURE — 700102 HCHG RX REV CODE 250 W/ 637 OVERRIDE(OP): Performed by: ANESTHESIOLOGY

## 2021-06-25 PROCEDURE — 700102 HCHG RX REV CODE 250 W/ 637 OVERRIDE(OP): Performed by: HOSPITALIST

## 2021-06-25 PROCEDURE — 501572 HCHG TROCAR, SHIELD OBTU 5X100: Performed by: SURGERY

## 2021-06-25 PROCEDURE — 36415 COLL VENOUS BLD VENIPUNCTURE: CPT

## 2021-06-25 PROCEDURE — 80053 COMPREHEN METABOLIC PANEL: CPT

## 2021-06-25 PROCEDURE — 500256 HCHG CATH, REDDICK: Performed by: SURGERY

## 2021-06-25 PROCEDURE — 700105 HCHG RX REV CODE 258: Performed by: ANESTHESIOLOGY

## 2021-06-25 PROCEDURE — BF532Z0 OTHER IMAGING OF GALLBLADDER AND BILE DUCTS USING FLUORESCING AGENT, INTRAOPERATIVE: ICD-10-PCS | Performed by: SURGERY

## 2021-06-25 PROCEDURE — 502571 HCHG PACK, LAP CHOLE: Performed by: SURGERY

## 2021-06-25 PROCEDURE — 160029 HCHG SURGERY MINUTES - 1ST 30 MINS LEVEL 4: Performed by: SURGERY

## 2021-06-25 PROCEDURE — 501577 HCHG TROCAR, STEP 11MM: Performed by: SURGERY

## 2021-06-25 PROCEDURE — 74300 X-RAY BILE DUCTS/PANCREAS: CPT

## 2021-06-25 PROCEDURE — 501838 HCHG SUTURE GENERAL: Performed by: SURGERY

## 2021-06-25 PROCEDURE — 500378 HCHG DRAIN, J-VAC ROUND 19FR: Performed by: SURGERY

## 2021-06-25 PROCEDURE — 160009 HCHG ANES TIME/MIN: Performed by: SURGERY

## 2021-06-25 PROCEDURE — A9270 NON-COVERED ITEM OR SERVICE: HCPCS | Performed by: HOSPITALIST

## 2021-06-25 PROCEDURE — 0FT44ZZ RESECTION OF GALLBLADDER, PERCUTANEOUS ENDOSCOPIC APPROACH: ICD-10-PCS | Performed by: SURGERY

## 2021-06-25 PROCEDURE — 160036 HCHG PACU - EA ADDL 30 MINS PHASE I: Performed by: SURGERY

## 2021-06-25 PROCEDURE — 770001 HCHG ROOM/CARE - MED/SURG/GYN PRIV*

## 2021-06-25 PROCEDURE — 88304 TISSUE EXAM BY PATHOLOGIST: CPT

## 2021-06-25 PROCEDURE — 500179 HCHG CATH, CHOLANGIOGRAM: Performed by: SURGERY

## 2021-06-25 RX ORDER — SODIUM CHLORIDE 9 MG/ML
INJECTION, SOLUTION INTRAVENOUS
Status: DISCONTINUED | OUTPATIENT
Start: 2021-06-25 | End: 2021-06-25 | Stop reason: SURG

## 2021-06-25 RX ORDER — SUCCINYLCHOLINE CHLORIDE 20 MG/ML
INJECTION INTRAMUSCULAR; INTRAVENOUS PRN
Status: DISCONTINUED | OUTPATIENT
Start: 2021-06-25 | End: 2021-06-25 | Stop reason: SURG

## 2021-06-25 RX ORDER — GLYCOPYRROLATE 0.2 MG/ML
INJECTION INTRAMUSCULAR; INTRAVENOUS PRN
Status: DISCONTINUED | OUTPATIENT
Start: 2021-06-25 | End: 2021-06-25 | Stop reason: SURG

## 2021-06-25 RX ORDER — CELECOXIB 200 MG/1
200 CAPSULE ORAL ONCE
Status: COMPLETED | OUTPATIENT
Start: 2021-06-25 | End: 2021-06-25

## 2021-06-25 RX ORDER — HYDROMORPHONE HYDROCHLORIDE 1 MG/ML
0.2 INJECTION, SOLUTION INTRAMUSCULAR; INTRAVENOUS; SUBCUTANEOUS
Status: DISCONTINUED | OUTPATIENT
Start: 2021-06-25 | End: 2021-06-25 | Stop reason: HOSPADM

## 2021-06-25 RX ORDER — SODIUM CHLORIDE, SODIUM LACTATE, POTASSIUM CHLORIDE, CALCIUM CHLORIDE 600; 310; 30; 20 MG/100ML; MG/100ML; MG/100ML; MG/100ML
INJECTION, SOLUTION INTRAVENOUS CONTINUOUS
Status: ACTIVE | OUTPATIENT
Start: 2021-06-25 | End: 2021-06-25

## 2021-06-25 RX ORDER — OXYCODONE HCL 5 MG/5 ML
5 SOLUTION, ORAL ORAL
Status: DISCONTINUED | OUTPATIENT
Start: 2021-06-25 | End: 2021-06-25 | Stop reason: HOSPADM

## 2021-06-25 RX ORDER — ROCURONIUM BROMIDE 10 MG/ML
INJECTION, SOLUTION INTRAVENOUS PRN
Status: DISCONTINUED | OUTPATIENT
Start: 2021-06-25 | End: 2021-06-25 | Stop reason: SURG

## 2021-06-25 RX ORDER — ACETAMINOPHEN 500 MG
1000 TABLET ORAL ONCE
Status: COMPLETED | OUTPATIENT
Start: 2021-06-25 | End: 2021-06-25

## 2021-06-25 RX ORDER — HYDROMORPHONE HYDROCHLORIDE 1 MG/ML
0.4 INJECTION, SOLUTION INTRAMUSCULAR; INTRAVENOUS; SUBCUTANEOUS
Status: DISCONTINUED | OUTPATIENT
Start: 2021-06-25 | End: 2021-06-25 | Stop reason: HOSPADM

## 2021-06-25 RX ORDER — NEOSTIGMINE METHYLSULFATE 1 MG/ML
INJECTION, SOLUTION INTRAVENOUS PRN
Status: DISCONTINUED | OUTPATIENT
Start: 2021-06-25 | End: 2021-06-25 | Stop reason: SURG

## 2021-06-25 RX ORDER — DIPHENHYDRAMINE HYDROCHLORIDE 50 MG/ML
12.5 INJECTION INTRAMUSCULAR; INTRAVENOUS
Status: DISCONTINUED | OUTPATIENT
Start: 2021-06-25 | End: 2021-06-25 | Stop reason: HOSPADM

## 2021-06-25 RX ORDER — HALOPERIDOL 5 MG/ML
1 INJECTION INTRAMUSCULAR
Status: DISCONTINUED | OUTPATIENT
Start: 2021-06-25 | End: 2021-06-25 | Stop reason: HOSPADM

## 2021-06-25 RX ORDER — GABAPENTIN 300 MG/1
300 CAPSULE ORAL ONCE
Status: COMPLETED | OUTPATIENT
Start: 2021-06-25 | End: 2021-06-25

## 2021-06-25 RX ORDER — METOCLOPRAMIDE HYDROCHLORIDE 5 MG/ML
INJECTION INTRAMUSCULAR; INTRAVENOUS PRN
Status: DISCONTINUED | OUTPATIENT
Start: 2021-06-25 | End: 2021-06-25 | Stop reason: SURG

## 2021-06-25 RX ORDER — OXYCODONE HCL 5 MG/5 ML
10 SOLUTION, ORAL ORAL
Status: DISCONTINUED | OUTPATIENT
Start: 2021-06-25 | End: 2021-06-25 | Stop reason: HOSPADM

## 2021-06-25 RX ORDER — ONDANSETRON 2 MG/ML
INJECTION INTRAMUSCULAR; INTRAVENOUS PRN
Status: DISCONTINUED | OUTPATIENT
Start: 2021-06-25 | End: 2021-06-25 | Stop reason: SURG

## 2021-06-25 RX ORDER — MEPERIDINE HYDROCHLORIDE 25 MG/ML
6.25 INJECTION INTRAMUSCULAR; INTRAVENOUS; SUBCUTANEOUS
Status: DISCONTINUED | OUTPATIENT
Start: 2021-06-25 | End: 2021-06-25 | Stop reason: HOSPADM

## 2021-06-25 RX ORDER — HYDROMORPHONE HYDROCHLORIDE 1 MG/ML
0.1 INJECTION, SOLUTION INTRAMUSCULAR; INTRAVENOUS; SUBCUTANEOUS
Status: DISCONTINUED | OUTPATIENT
Start: 2021-06-25 | End: 2021-06-25 | Stop reason: HOSPADM

## 2021-06-25 RX ORDER — PHENYLEPHRINE HCL IN 0.9% NACL 0.5 MG/5ML
SYRINGE (ML) INTRAVENOUS PRN
Status: DISCONTINUED | OUTPATIENT
Start: 2021-06-25 | End: 2021-06-25 | Stop reason: SURG

## 2021-06-25 RX ORDER — BUPIVACAINE HYDROCHLORIDE AND EPINEPHRINE 5; 5 MG/ML; UG/ML
INJECTION, SOLUTION EPIDURAL; INTRACAUDAL; PERINEURAL
Status: DISCONTINUED | OUTPATIENT
Start: 2021-06-25 | End: 2021-06-25 | Stop reason: HOSPADM

## 2021-06-25 RX ORDER — CEFAZOLIN SODIUM 1 G/3ML
INJECTION, POWDER, FOR SOLUTION INTRAMUSCULAR; INTRAVENOUS PRN
Status: DISCONTINUED | OUTPATIENT
Start: 2021-06-25 | End: 2021-06-25 | Stop reason: SURG

## 2021-06-25 RX ORDER — ONDANSETRON 2 MG/ML
4 INJECTION INTRAMUSCULAR; INTRAVENOUS
Status: DISCONTINUED | OUTPATIENT
Start: 2021-06-25 | End: 2021-06-25 | Stop reason: HOSPADM

## 2021-06-25 RX ORDER — MIDAZOLAM HYDROCHLORIDE 1 MG/ML
INJECTION INTRAMUSCULAR; INTRAVENOUS PRN
Status: DISCONTINUED | OUTPATIENT
Start: 2021-06-25 | End: 2021-06-25 | Stop reason: SURG

## 2021-06-25 RX ORDER — DEXAMETHASONE SODIUM PHOSPHATE 4 MG/ML
INJECTION, SOLUTION INTRA-ARTICULAR; INTRALESIONAL; INTRAMUSCULAR; INTRAVENOUS; SOFT TISSUE PRN
Status: DISCONTINUED | OUTPATIENT
Start: 2021-06-25 | End: 2021-06-25 | Stop reason: SURG

## 2021-06-25 RX ORDER — MAGNESIUM SULFATE HEPTAHYDRATE 40 MG/ML
INJECTION, SOLUTION INTRAVENOUS PRN
Status: DISCONTINUED | OUTPATIENT
Start: 2021-06-25 | End: 2021-06-25 | Stop reason: SURG

## 2021-06-25 RX ORDER — LIDOCAINE HYDROCHLORIDE 20 MG/ML
INJECTION, SOLUTION EPIDURAL; INFILTRATION; INTRACAUDAL; PERINEURAL PRN
Status: DISCONTINUED | OUTPATIENT
Start: 2021-06-25 | End: 2021-06-25 | Stop reason: SURG

## 2021-06-25 RX ADMIN — LEVOTHYROXINE SODIUM 25 MCG: 0.03 TABLET ORAL at 05:33

## 2021-06-25 RX ADMIN — GLYCOPYRROLATE 0.2 MG: 0.2 INJECTION INTRAMUSCULAR; INTRAVENOUS at 14:44

## 2021-06-25 RX ADMIN — HYDROMORPHONE HYDROCHLORIDE 0.4 MG: 1 INJECTION, SOLUTION INTRAMUSCULAR; INTRAVENOUS; SUBCUTANEOUS at 15:26

## 2021-06-25 RX ADMIN — METOCLOPRAMIDE 10 MG: 5 INJECTION, SOLUTION INTRAMUSCULAR; INTRAVENOUS at 13:35

## 2021-06-25 RX ADMIN — LISINOPRIL 20 MG: 20 TABLET ORAL at 05:33

## 2021-06-25 RX ADMIN — PROPOFOL 100 MG: 10 INJECTION, EMULSION INTRAVENOUS at 13:35

## 2021-06-25 RX ADMIN — VERAPAMIL HYDROCHLORIDE 180 MG: 180 TABLET, FILM COATED, EXTENDED RELEASE ORAL at 17:49

## 2021-06-25 RX ADMIN — DEXAMETHASONE SODIUM PHOSPHATE 8 MG: 4 INJECTION, SOLUTION INTRA-ARTICULAR; INTRALESIONAL; INTRAMUSCULAR; INTRAVENOUS; SOFT TISSUE at 13:35

## 2021-06-25 RX ADMIN — Medication 300 MCG: at 13:49

## 2021-06-25 RX ADMIN — MAGNESIUM SULFATE IN WATER 4 G: 40 INJECTION, SOLUTION INTRAVENOUS at 13:41

## 2021-06-25 RX ADMIN — OXYCODONE 5 MG: 5 TABLET ORAL at 20:55

## 2021-06-25 RX ADMIN — Medication 200 MCG: at 13:53

## 2021-06-25 RX ADMIN — SUCCINYLCHOLINE CHLORIDE 140 MG: 20 INJECTION, SOLUTION INTRAMUSCULAR; INTRAVENOUS; PARENTERAL at 13:35

## 2021-06-25 RX ADMIN — SODIUM CHLORIDE, POTASSIUM CHLORIDE, SODIUM LACTATE AND CALCIUM CHLORIDE: 600; 310; 30; 20 INJECTION, SOLUTION INTRAVENOUS at 13:31

## 2021-06-25 RX ADMIN — Medication 200 MCG: at 14:01

## 2021-06-25 RX ADMIN — GABAPENTIN 300 MG: 300 CAPSULE ORAL at 13:25

## 2021-06-25 RX ADMIN — FENTANYL CITRATE 50 MCG: 50 INJECTION, SOLUTION INTRAMUSCULAR; INTRAVENOUS at 15:33

## 2021-06-25 RX ADMIN — NEOSTIGMINE METHYLSULFATE 2 MG: 1 INJECTION INTRAVENOUS at 14:44

## 2021-06-25 RX ADMIN — LIDOCAINE HYDROCHLORIDE 100 MG: 20 INJECTION, SOLUTION EPIDURAL; INFILTRATION; INTRACAUDAL at 13:35

## 2021-06-25 RX ADMIN — FENTANYL CITRATE 100 MCG: 50 INJECTION, SOLUTION INTRAMUSCULAR; INTRAVENOUS at 13:35

## 2021-06-25 RX ADMIN — SODIUM CHLORIDE: 9 INJECTION, SOLUTION INTRAVENOUS at 14:33

## 2021-06-25 RX ADMIN — MIDAZOLAM HYDROCHLORIDE 2 MG: 1 INJECTION, SOLUTION INTRAMUSCULAR; INTRAVENOUS at 13:31

## 2021-06-25 RX ADMIN — ROCURONIUM BROMIDE 20 MG: 10 INJECTION, SOLUTION INTRAVENOUS at 13:43

## 2021-06-25 RX ADMIN — ONDANSETRON 4 MG: 2 INJECTION INTRAMUSCULAR; INTRAVENOUS at 13:35

## 2021-06-25 RX ADMIN — FENTANYL CITRATE 50 MCG: 50 INJECTION, SOLUTION INTRAMUSCULAR; INTRAVENOUS at 15:19

## 2021-06-25 RX ADMIN — Medication 300 MCG: at 14:13

## 2021-06-25 RX ADMIN — CEFAZOLIN 2 G: 330 INJECTION, POWDER, FOR SOLUTION INTRAMUSCULAR; INTRAVENOUS at 13:31

## 2021-06-25 RX ADMIN — CELECOXIB 200 MG: 200 CAPSULE ORAL at 13:25

## 2021-06-25 RX ADMIN — OMEPRAZOLE 20 MG: 20 CAPSULE, DELAYED RELEASE ORAL at 05:33

## 2021-06-25 RX ADMIN — ACETAMINOPHEN 1000 MG: 500 TABLET ORAL at 13:25

## 2021-06-25 RX ADMIN — FENTANYL CITRATE 50 MCG: 50 INJECTION, SOLUTION INTRAMUSCULAR; INTRAVENOUS at 15:00

## 2021-06-25 ASSESSMENT — ENCOUNTER SYMPTOMS
ABDOMINAL PAIN: 1
VOMITING: 0
CHILLS: 0
FEVER: 0
NAUSEA: 0
SHORTNESS OF BREATH: 0
PALPITATIONS: 0
DIZZINESS: 0
COUGH: 0
HEADACHES: 0

## 2021-06-25 ASSESSMENT — PAIN DESCRIPTION - PAIN TYPE
TYPE: SURGICAL PAIN

## 2021-06-25 ASSESSMENT — PAIN SCALES - GENERAL: PAIN_LEVEL: 2

## 2021-06-25 ASSESSMENT — PAIN SCALES - WONG BAKER: WONGBAKER_NUMERICALRESPONSE: HURTS EVEN MORE

## 2021-06-25 NOTE — PROGRESS NOTES
Pt arrived to unit with transport. Pt asleep, awakes with verbal stimulation. Complains of pain and falls asleep  X4 lap sites with gauze/ tegaderm. BRIAN drain to RLQ.   Clear liquids, NPO at midnight for ERCP with GI.

## 2021-06-25 NOTE — ANESTHESIA TIME REPORT
Anesthesia Start and Stop Event Times     Date Time Event    6/25/2021 1309 Ready for Procedure     1331 Anesthesia Start     1459 Anesthesia Stop        Responsible Staff  06/25/21    Name Role Begin End    Philip Morales M.D. Anesth 1331 1451        Preop Diagnosis (Free Text):  Pre-op Diagnosis     ACUTE CHOLECYSTITIS        Preop Diagnosis (Codes):    Post op Diagnosis  Acute cholecystitis      Premium Reason  Non-Premium    Comments:

## 2021-06-25 NOTE — OR NURSING
Patient arrived to PACU moaning in pain. Medicated with IV pain medications. She is resting now. It was difficult to get her to talk with me and it seems she feeling better now and resting. Dr Crane at bedside to see patient.

## 2021-06-25 NOTE — ASSESSMENT & PLAN NOTE
US +, also showing possible CBD dilatation. Bili is wnl pain is resolved.   gen surg consulted  s/p kaylah with intraop cholangiogram on 6/25 showing stones  ERCP to be done 6/26

## 2021-06-25 NOTE — PROGRESS NOTES
Received report from previous shift RN  Assessment complete.  A&O x 4. Patient calls appropriately.  Patient ambulates with SBA assist.   Patient has 0/10 pain.   Denies N&V. NPO at this time.  + void, + flatus, PTA BM.  Patient denies SOB.  MRCP ordered for today .  Review plan with of care with patient. Call light and personal belongings with in reach. Hourly rounding in place. All needs met at this time.

## 2021-06-25 NOTE — PROGRESS NOTES
2 RN skin check complete  Devices in place: PIV  Confirmed pressure ulcers found: none  Sacrum, heels and elbows, and ears: intact ad blanching.  Old healed scars from medication allergic reaction to BLE.  No potential new ulcers or pressure injuries noted.

## 2021-06-25 NOTE — CONSULTS
CHIEF COMPLAINT: right upper quadrant pain     HISTORY OF PRESENT ILLNESS:  The patient is a 60 year-old woman who presents to the Emergency Department a several- week history of intermittent right subcostal and right upper quadrant  abdominal pain. The pain is associated with nausea and vomiting. She reports frequent precipitation and exacerbation of symptoms with ingestion of all types of foods. She denies a family history of gallstones. The patient denies any recent or intercurrent illness. The patient denies any recent or intercurrent illness. The patient has undergone a hysterectomy.    PAST MEDICAL HISTORY:  has a past medical history of Arthritis, GERD (gastroesophageal reflux disease), and Hypertension.    PAST SURGICAL HISTORY:  has a past surgical history that includes hysterectomy laparoscopy.     ALLERGIES:   Allergies   Allergen Reactions   • Atenolol    • Darvon [Propoxyphene Hcl]         CURRENT MEDICATIONS:   Home Medications     Reviewed by Kristina Root (Pharmacy Tech) on 06/24/21 at 1931  Med List Status: Complete   Medication Last Dose Status   amLODIPine (NORVASC) 5 MG Tab 6/23/2021 Active   levothyroxine (SYNTHROID) 25 MCG Tab >2 days ago Active   magnesium hydroxide (MILK OF MAGNESIA) 400 MG/5ML Suspension 6/23/2021 Active   methotrexate 2.5 MG Tab >3 weeks ago Active   naproxen (ALEVE) 220 MG tablet >7 days ago Active   pantoprazole (PROTONIX) 40 MG Tablet Delayed Response 6/23/2021 Active   predniSONE (DELTASONE) 5 MG Tab >3 weeks ago Active   psyllium (METAMUCIL) 58.12 % Pack >2 days ago Active   simethicone (MYLICON) 80 MG Chew Tab 6/23/2021 Active                FAMILY HISTORY: History reviewed. No pertinent family history.    SOCIAL HISTORY:   Social History     Tobacco Use   • Smoking status: Never Smoker   • Smokeless tobacco: Never Used   Substance and Sexual Activity   • Alcohol use: No   • Drug use: No   • Sexual activity: Not on file       REVIEW OF SYSTEMS:  "Comprehensive review of systems is negative with the exception of the aforementioned HPI, PMH, and PSH bullets in accordance with CMS guidelines.     PHYSICAL EXAMINATION:   CONSTITUTIONAL:     Vital Signs: /73   Pulse 77   Temp 36.4 °C (97.6 °F) (Temporal)   Resp 18   Ht 1.549 m (5' 1\")   Wt 70.7 kg (155 lb 13.8 oz)   SpO2 99%    General Appearance: appears stated age, is in no apparent distress.  HEAD AND NECK: The pupils are equal, round, and reactive to light bilaterally. The extraocular muscles are intact bilaterally.. The sclera are non icteric. Nares and oropharynx are clear.   NECK: Supple. No adenopathy.  RESPIRATORY:   Inspection: Unlabored respirations, no intercostal retractions, paradoxical motion, or accessory muscle use.   Palpation:  The chest is nontender.    Auscultation: clear to auscultation.  CARDIOVASCULAR:   Inspection: The skin is warm, dry and well purfused.  Auscultation: Regular rate and rhythm.   Peripheral Pulses: Normal.   ABDOMEN:   Inspection: Abdominal inspection reveals no abdominal distension.   Palpation: Palpation is remarkable for mild tenderness in the right subcostal region.   EXTREMITIES: Examination of the upper and lower extremities demonstrates no cyanosis edema or clubbing.  NEUROLOGIC: Alert & oriented x 3, Normal motor function, Normal sensory function, No focal deficits noted.  PSYCHIATRIC:   oriented to time, place, person, judgement and insight appear intact.    LABORATORY VALUES:   Recent Labs     06/24/21 1607 06/25/21 0324   WBC 5.0 4.8   RBC 4.62 4.50   HEMOGLOBIN 10.4* 10.1*   HEMATOCRIT 35.6* 34.2*   MCV 77.1* 76.0*   MCH 22.5* 22.4*   MCHC 29.2* 29.5*   RDW 56.9* 55.9*   PLATELETCT 555* 503*   MPV 10.2 9.9     Recent Labs     06/24/21  1607 06/25/21 0324   SODIUM 141 141   POTASSIUM 4.1 3.3*   CHLORIDE 107 109   CO2 23 21   GLUCOSE 124* 103*   BUN 4* 4*   CREATININE 0.72 0.57   CALCIUM 9.7 9.1     Recent Labs     06/24/21  1607 06/25/21 0324 "   ASTSGOT 28 25   ALTSGPT 21 18   TBILIRUBIN 0.2 0.3   ALKPHOSPHAT 149* 131*   GLOBULIN 4.0* 3.6*            IMAGING:   [id number]    (Results Pending)        IMPRESSION AND PLAN:    The patient has Grade I (mild) cholelithiasis and possible choledocholithiasis basesd upon sonography. Plan: laparoscopic cholecystectomy and Intraoperative cholangiogram.    The patient will be taken to the operating room for laparoscopic cholecystectomy and Intraoperative cholangiogram. The surgical conduct was discussed in detail. Potential complications including, but not limited to, infection, bleeding, damage to adjacent structures, bile duct injury, need to convert to an open procedure, and anesthetic complications were discussed. Questions were elicited and answered to the patient's satisfaction.  Operative consent signed.  Admission SARS-CoV-2 testing negative. LOW RISK patient. Repeat SARS-CoV-2 testing not indicated. Isolation precautions de-escalated.     ____________________________________     Abdirashid Restrepo M.D.    DD: 6/25/2021  9:49 AM

## 2021-06-25 NOTE — ASSESSMENT & PLAN NOTE
Uncontrolled HTN bp in the 200's   Will restart her on her po medication since she did not take them today.   PRN bp meds

## 2021-06-25 NOTE — CONSULTS
Gastroenterology Initial Consult Note               Author:  TEGAN Coon Date & Time Created: 6/25/2021 3:49 PM       Patient ID:  Name:             Chawla , Sushma Kumari  YOB: 1960  Age:                 60 y.o.  female  MRN:               [id number]      Referring Provider:  Nabil Deleon MD      Presenting Chief Complaint:  Abdominal pain, Cholelithiasis with choledocholithiasis      History of Present Illness:    She is a 60-year-old female patient seen in consultation for abdominal pain with findings of cholelithiasis and choledocholithiasis.  She previously saw an urgent care with abdominal pain, ultrasound showing gallbladder disease.  She underwent laparoscopic cholecystectomy by Dr. Abdirashid Restrepo this afternoon.  She was found during surgery to have gallbladder wall thickening and acute inflammation with dilation of the common bile duct.  Intraoperative cholangiogram demonstrated dilated biliary ducts with multiple mid common bile duct filling defects with no contract extravasation.    She is seen in PACU by myself and Dr. Jori choi.  She has just received pain medications and is not very responsive to questioning.  I did speak with her  Michael to obtain further history.  She has a past medical history that includes arthritis, GERD, hypertension.  She has never seen a GI doctor and has never had a screening colonoscopy.  She has undergone laparoscopic hysterectomy in the past.  Current labs reviewed including CBC with hemoglobin 10.1, hematocrit 34.2, MCV 76.  Potassium 3.3, glucose 103, alkaline phosphatase 131, bilirubin normal.      Review of Systems:  Review of Systems   Unable to perform ROS: Medical condition       Sedated      Past Medical History:  Past Medical History:   Diagnosis Date   • Arthritis    • GERD (gastroesophageal reflux disease)    • Hypertension      Active Hospital Problems    Diagnosis    • HTN (hypertension) [I10]    • Cholecystitis [K81.9]    •  Arthritis [M19.90]          Past Surgical History:  Past Surgical History:   Procedure Laterality Date   • HYSTERECTOMY LAPAROSCOPY             Hospital Medications:  Current Facility-Administered Medications   Medication Dose Frequency Provider Last Rate Last Admin   • lactated ringers infusion   Continuous Philip Morales M.D.   Stopped at 06/25/21 1433   • ondansetron (ZOFRAN) syringe/vial injection 4 mg  4 mg Once PRN Philip Morales M.D.       • haloperidol lactate (HALDOL) injection 1 mg  1 mg Q15 MIN PRN Philip Morales M.D.       • diphenhydrAMINE (BENADRYL) injection 12.5 mg  12.5 mg Q15 MIN PRN Philip Morales M.D.       • fentaNYL (SUBLIMAZE) injection 25 mcg  25 mcg Q2 MIN PRN Philip Morales M.D.        Or   • fentaNYL (SUBLIMAZE) injection 50 mcg  50 mcg Q2 MIN PRN Philip Morales M.D.   50 mcg at 06/25/21 1533   • oxyCODONE (ROXICODONE) oral solution 5 mg  5 mg Once PRN Philip Morales M.D.        Or   • oxyCODONE (ROXICODONE) oral solution 10 mg  10 mg Once PRN Philip Morales M.D.       • HYDROmorphone (Dilaudid) injection 0.1 mg  0.1 mg Q5 MIN PRN Philip Morales M.D.        Or   • HYDROmorphone (Dilaudid) injection 0.2 mg  0.2 mg Q5 MIN PRN Philip Morales M.D.        Or   • HYDROmorphone (Dilaudid) injection 0.4 mg  0.4 mg Q5 MIN PRN Philip Morales M.D.   0.4 mg at 06/25/21 1526   • meperidine (DEMEROL) injection 6.25 mg  6.25 mg Q5 MIN PRN Philip Morales M.D.       • ePHEDrine injection 5 mg  5 mg Q5 MIN PRN Philip Morales M.D.       • [MAR Hold] amLODIPine (NORVASC) tablet 5 mg  5 mg Q DAY Fabian Young M.D.   5 mg at 06/24/21 2258   • [MAR Hold] levothyroxine (SYNTHROID) tablet 25 mcg  25 mcg AM ES Fabian Young M.D.   25 mcg at 06/25/21 0533   • [MAR Hold] lisinopril (PRINIVIL) tablet 20 mg  20 mg DAILY Fabian Young M.D.   20 mg at 06/25/21 0533   • [MAR Hold] verapamil ER (CALAN-SR) tablet 180 mg  180 mg DAILY AT 1800 Fabian Young M.D.   180 mg at 06/24/21 2258   • [MAR Hold] Respiratory  Therapy Consult   Continuous RT Fabian Young M.D.       • NS infusion   Continuous Fabian Young M.D. 75 mL/hr at 06/24/21 2229 New Bag at 06/24/21 2229   • [MAR Hold] Pharmacy Consult Request ...Pain Management Review 1 Each  1 Each PHARMACY TO DOSE Fabian Young M.D.       • [MAR Hold] oxyCODONE immediate-release (ROXICODONE) tablet 2.5 mg  2.5 mg Q3HRS PRSANDRA Young M.D.        Or   • [MAR Hold] oxyCODONE immediate-release (ROXICODONE) tablet 5 mg  5 mg Q3HRS PRSANDRA Young M.D.        Or   • [MAR Hold] morphine (pf) 4 mg/mL injection 2 mg  2 mg Q3HRS PRSANDRA Young M.D.       • [MAR Hold] ondansetron (ZOFRAN) syringe/vial injection 4 mg  4 mg Q4HRS PRSANDRA Young M.D.       • [MAR Hold] ondansetron (ZOFRAN ODT) dispertab 4 mg  4 mg Q4HRS PRSANDRA Young M.D.       • [MAR Hold] promethazine (PHENERGAN) tablet 12.5-25 mg  12.5-25 mg Q4HRS PRSANDRA Young M.D.       • [MAR Hold] promethazine (PHENERGAN) suppository 12.5-25 mg  12.5-25 mg Q4HRS PRSANDRA Young M.D.       • [MAR Hold] prochlorperazine (COMPAZINE) injection 5-10 mg  5-10 mg Q4HRS PRSANDRA Young M.D.       • [MAR Hold] hydrALAZINE (APRESOLINE) injection 10 mg  10 mg Q6HRS PRSANDRA Young M.D.   10 mg at 06/24/21 1948   • [MAR Hold] omeprazole (PRILOSEC) capsule 20 mg  20 mg DAILY Fabian Young M.D.   20 mg at 06/25/21 0533   Last reviewed on 6/25/2021 11:43 AM by Philip Morales M.D.        Current Outpatient Medications:  Medications Prior to Admission   Medication Sig Dispense Refill Last Dose   • predniSONE (DELTASONE) 5 MG Tab Take 5 mg by mouth every day.   >3 weeks ago at stopped   • simethicone (MYLICON) 80 MG Chew Tab Chew 80 mg every 6 hours as needed for Flatulence.   6/23/2021 at PM   • psyllium (METAMUCIL) 58.12 % Pack Take 1 Packet by mouth every day.   >2 days ago at unknown   • magnesium hydroxide (MILK OF  MAGNESIA) 400 MG/5ML Suspension Take 30 mL by mouth 1 time a day as needed.   6/23/2021 at PM   • naproxen (ALEVE) 220 MG tablet Take 440 mg by mouth one time as needed.   >7 days ago at unknown   • pantoprazole (PROTONIX) 40 MG Tablet Delayed Response Take 40 mg by mouth every day.   6/23/2021 at PM   • amLODIPine (NORVASC) 5 MG Tab Take 5 mg by mouth every day.   6/23/2021 at PM   • methotrexate 2.5 MG Tab Take 7.5 mg by mouth every 7 days.   >3 weeks ago at unknown   • levothyroxine (SYNTHROID) 25 MCG Tab Take 25 mcg by mouth Every morning on an empty stomach.   >2 days ago at unknown         Medication Allergies:  Allergies   Allergen Reactions   • Atenolol    • Darvon [Propoxyphene Hcl]          Family Medical History:  History reviewed. No pertinent family history.      Social History:  Social History     Socioeconomic History   • Marital status:      Spouse name: Not on file   • Number of children: Not on file   • Years of education: Not on file   • Highest education level: Not on file   Occupational History   • Not on file   Tobacco Use   • Smoking status: Never Smoker   • Smokeless tobacco: Never Used   Substance and Sexual Activity   • Alcohol use: No   • Drug use: No   • Sexual activity: Not on file   Other Topics Concern   • Not on file   Social History Narrative   • Not on file     Social Determinants of Health     Financial Resource Strain:    • Difficulty of Paying Living Expenses:    Food Insecurity:    • Worried About Running Out of Food in the Last Year:    • Ran Out of Food in the Last Year:    Transportation Needs:    • Lack of Transportation (Medical):    • Lack of Transportation (Non-Medical):    Physical Activity:    • Days of Exercise per Week:    • Minutes of Exercise per Session:    Stress:    • Feeling of Stress :    Social Connections:    • Frequency of Communication with Friends and Family:    • Frequency of Social Gatherings with Friends and Family:    • Attends [employer]  "Services:    • Active Member of Clubs or Organizations:    • Attends Club or Organization Meetings:    • Marital Status:    Intimate Partner Violence:    • Fear of Current or Ex-Partner:    • Emotionally Abused:    • Physically Abused:    • Sexually Abused:          Vital signs:  Weight/BMI: Body mass index is 29.45 kg/m².  /53   Pulse 74   Temp 36.4 °C (97.6 °F) (Temporal)   Resp 20   Ht 1.549 m (5' 1\")   Wt 70.7 kg (155 lb 13.8 oz)   SpO2 99%   Vitals:    06/25/21 1515 06/25/21 1530 06/25/21 1536 06/25/21 1545   BP: 136/73 122/49 123/49 119/53   Pulse: 74 74 67 74   Resp: (!) 23 (!) 21 (!) 21 20   Temp:       TempSrc:       SpO2: 100% 100% 100% 99%   Weight:       Height:         Oxygen Therapy:  Pulse Oximetry: 99 %, O2 (LPM): 3, O2 Delivery Device: Nasal Cannula    Intake/Output Summary (Last 24 hours) at 6/25/2021 1549  Last data filed at 6/25/2021 1500  Gross per 24 hour   Intake 1200 ml   Output 20 ml   Net 1180 ml         Physical Exam:  Physical Exam  Vitals and nursing note reviewed.   Constitutional:       Appearance: Normal appearance.   HENT:      Head: Normocephalic and atraumatic.      Right Ear: External ear normal.      Left Ear: External ear normal.      Nose: Nose normal.      Mouth/Throat:      Mouth: Mucous membranes are dry.      Pharynx: Oropharynx is clear.   Eyes:      Conjunctiva/sclera: Conjunctivae normal.   Cardiovascular:      Rate and Rhythm: Normal rate and regular rhythm.      Pulses: Normal pulses.      Heart sounds: Murmur heard.     Pulmonary:      Effort: Pulmonary effort is normal.      Breath sounds: Normal breath sounds. No wheezing or rales.   Abdominal:      General: Abdomen is flat. Bowel sounds are normal.      Palpations: Abdomen is soft.      Tenderness: There is abdominal tenderness (RUQ).      Comments: Right sided BRIAN Drain   Musculoskeletal:      Cervical back: Neck supple.      Right lower leg: No edema.      Left lower leg: No edema.   Lymphadenopathy: "      Cervical: No cervical adenopathy.   Skin:     General: Skin is warm and dry.   Neurological:      Mental Status: She is alert.   Psychiatric:         Behavior: Behavior is slowed.           Labs:  Recent Labs     06/24/21  1607 06/25/21 0324   SODIUM 141 141   POTASSIUM 4.1 3.3*   CHLORIDE 107 109   CO2 23 21   BUN 4* 4*   CREATININE 0.72 0.57   MAGNESIUM 2.4  --    CALCIUM 9.7 9.1     Recent Labs     06/24/21  1607 06/25/21  0324   ALTSGPT 21 18   ASTSGOT 28 25   ALKPHOSPHAT 149* 131*   TBILIRUBIN 0.2 0.3   LIPASE 32  --    GLUCOSE 124* 103*     Recent Labs     06/24/21  1607 06/25/21  0324   WBC 5.0 4.8   NEUTSPOLYS 69.10  --    LYMPHOCYTES 22.00  --    MONOCYTES 6.70  --    EOSINOPHILS 0.80  --    BASOPHILS 0.80  --    ASTSGOT 28 25   ALTSGPT 21 18   ALKPHOSPHAT 149* 131*   TBILIRUBIN 0.2 0.3     Recent Labs     06/24/21  1607 06/25/21  0324   RBC 4.62 4.50   HEMOGLOBIN 10.4* 10.1*   HEMATOCRIT 35.6* 34.2*   PLATELETCT 555* 503*     Recent Results (from the past 24 hour(s))   CBC WITH DIFFERENTIAL    Collection Time: 06/24/21  4:07 PM   Result Value Ref Range    WBC 5.0 4.8 - 10.8 K/uL    RBC 4.62 4.20 - 5.40 M/uL    Hemoglobin 10.4 (L) 12.0 - 16.0 g/dL    Hematocrit 35.6 (L) 37.0 - 47.0 %    MCV 77.1 (L) 81.4 - 97.8 fL    MCH 22.5 (L) 27.0 - 33.0 pg    MCHC 29.2 (L) 33.6 - 35.0 g/dL    RDW 56.9 (H) 35.9 - 50.0 fL    Platelet Count 555 (H) 164 - 446 K/uL    MPV 10.2 9.0 - 12.9 fL    Neutrophils-Polys 69.10 44.00 - 72.00 %    Lymphocytes 22.00 22.00 - 41.00 %    Monocytes 6.70 0.00 - 13.40 %    Eosinophils 0.80 0.00 - 6.90 %    Basophils 0.80 0.00 - 1.80 %    Immature Granulocytes 0.60 0.00 - 0.90 %    Nucleated RBC 0.00 /100 WBC    Neutrophils (Absolute) 3.42 2.00 - 7.15 K/uL    Lymphs (Absolute) 1.09 1.00 - 4.80 K/uL    Monos (Absolute) 0.33 0.00 - 0.85 K/uL    Eos (Absolute) 0.04 0.00 - 0.51 K/uL    Baso (Absolute) 0.04 0.00 - 0.12 K/uL    Immature Granulocytes (abs) 0.03 0.00 - 0.11 K/uL    NRBC  (Absolute) 0.00 K/uL    Anisocytosis 1+     Macrocytosis 1+    COMP METABOLIC PANEL    Collection Time: 06/24/21  4:07 PM   Result Value Ref Range    Sodium 141 135 - 145 mmol/L    Potassium 4.1 3.6 - 5.5 mmol/L    Chloride 107 96 - 112 mmol/L    Co2 23 20 - 33 mmol/L    Anion Gap 11.0 7.0 - 16.0    Glucose 124 (H) 65 - 99 mg/dL    Bun 4 (L) 8 - 22 mg/dL    Creatinine 0.72 0.50 - 1.40 mg/dL    Calcium 9.7 8.5 - 10.5 mg/dL    AST(SGOT) 28 12 - 45 U/L    ALT(SGPT) 21 2 - 50 U/L    Alkaline Phosphatase 149 (H) 30 - 99 U/L    Total Bilirubin 0.2 0.1 - 1.5 mg/dL    Albumin 3.8 3.2 - 4.9 g/dL    Total Protein 7.8 6.0 - 8.2 g/dL    Globulin 4.0 (H) 1.9 - 3.5 g/dL    A-G Ratio 1.0 g/dL   LIPASE    Collection Time: 06/24/21  4:07 PM   Result Value Ref Range    Lipase 32 11 - 82 U/L   ESTIMATED GFR    Collection Time: 06/24/21  4:07 PM   Result Value Ref Range    GFR If African American >60 >60 mL/min/1.73 m 2    GFR If Non African American >60 >60 mL/min/1.73 m 2   PERIPHERAL SMEAR REVIEW    Collection Time: 06/24/21  4:07 PM   Result Value Ref Range    Peripheral Smear Review see below    PLATELET ESTIMATE    Collection Time: 06/24/21  4:07 PM   Result Value Ref Range    Plt Estimation #CAC    MORPHOLOGY    Collection Time: 06/24/21  4:07 PM   Result Value Ref Range    RBC Morphology Present    DIFFERENTIAL COMMENT    Collection Time: 06/24/21  4:07 PM   Result Value Ref Range    Comments-Diff see below    MAGNESIUM    Collection Time: 06/24/21  4:07 PM   Result Value Ref Range    Magnesium 2.4 1.5 - 2.5 mg/dL   SARS-COV Antigen YIMI: Collect dry nasal swab AND NP swab in VTM    Collection Time: 06/24/21  6:43 PM   Result Value Ref Range    SARS-CoV-2 Source Nasal Swab    SARS-CoV-2 PCR (24 hour In-House): Collect NP swab in VTM    Collection Time: 06/24/21  6:43 PM    Specimen: Respirate   Result Value Ref Range    SARS-CoV-2 Source NP Swab     SARS-CoV-2 by PCR NotDetected    CBC without Differential    Collection Time:  06/25/21  3:24 AM   Result Value Ref Range    WBC 4.8 4.8 - 10.8 K/uL    RBC 4.50 4.20 - 5.40 M/uL    Hemoglobin 10.1 (L) 12.0 - 16.0 g/dL    Hematocrit 34.2 (L) 37.0 - 47.0 %    MCV 76.0 (L) 81.4 - 97.8 fL    MCH 22.4 (L) 27.0 - 33.0 pg    MCHC 29.5 (L) 33.6 - 35.0 g/dL    RDW 55.9 (H) 35.9 - 50.0 fL    Platelet Count 503 (H) 164 - 446 K/uL    MPV 9.9 9.0 - 12.9 fL   Comp Metabolic Panel (CMP)    Collection Time: 06/25/21  3:24 AM   Result Value Ref Range    Sodium 141 135 - 145 mmol/L    Potassium 3.3 (L) 3.6 - 5.5 mmol/L    Chloride 109 96 - 112 mmol/L    Co2 21 20 - 33 mmol/L    Anion Gap 11.0 7.0 - 16.0    Glucose 103 (H) 65 - 99 mg/dL    Bun 4 (L) 8 - 22 mg/dL    Creatinine 0.57 0.50 - 1.40 mg/dL    Calcium 9.1 8.5 - 10.5 mg/dL    AST(SGOT) 25 12 - 45 U/L    ALT(SGPT) 18 2 - 50 U/L    Alkaline Phosphatase 131 (H) 30 - 99 U/L    Total Bilirubin 0.3 0.1 - 1.5 mg/dL    Albumin 3.6 3.2 - 4.9 g/dL    Total Protein 7.2 6.0 - 8.2 g/dL    Globulin 3.6 (H) 1.9 - 3.5 g/dL    A-G Ratio 1.0 g/dL   ESTIMATED GFR    Collection Time: 06/25/21  3:24 AM   Result Value Ref Range    GFR If African American >60 >60 mL/min/1.73 m 2    GFR If Non African American >60 >60 mL/min/1.73 m 2   Histology Request    Collection Time: 06/25/21  3:05 PM   Result Value Ref Range    Pathology Request Sent to Histo          Radiology Review:  [id number]   Final Result      9 seconds fluoroscopy time utilized for intraoperative cholangiogram.            MDM (Data Review):   -Records reviewed and summarized in current documentation  -I personally reviewed and interpreted the laboratory results  -I personally reviewed the radiology images      Medical Decision Making, by Problem:  Active Hospital Problems    Diagnosis    • HTN (hypertension) [I10]    • Cholecystitis [K81.9]    • Arthritis [M19.90]            Assessment/Recommendations:  Assessment:  1.  Cholecystitis with cholelithiasis status post laparoscopic cholecystectomy with  findings of cholangiogram consistent with choledocholithiasis  2.  Upper abdominal pain  3.  Elevated alkaline phosphatase    Plan:  1.  Endoscopic retrograde cholangiopancreatography with Dr. Jori Crane tomorrow 6/26/2021, time to be determined, but likely late morning/early afternoon depending on OR timing availability.  Will be back later this evening, or tomorrow morning to discuss the risks and benefits of the procedure with the patient and her  at the patient has sedated currently.    2.  Clear liquid diet tonight, n.p.o. after midnight    3.  Pain management antiemetics per primary team    4.  A.m. CBC, CMP, PT/INR    Thank you very much for allowing me to participate in the care of your patient.  Please feel free to contact me anytime at [phone].     GIOVANA Coon.    Core Quality Measures   Reviewed items::  Labs, Medications and Radiology reports reviewed

## 2021-06-25 NOTE — ANESTHESIA PREPROCEDURE EVALUATION
"    Relevant Problems   CARDIAC   (positive) HTN (hypertension)      GI   (positive) GERD (gastroesophageal reflux disease)      Other   (positive) Arthritis   (positive) Cholecystitis       Physical Exam    Airway   Mallampati: II  TM distance: >3 FB  Neck ROM: full       Cardiovascular - normal exam  Rhythm: regular  Rate: normal  (-) murmur     Dental - normal exam           Pulmonary - normal exam  Breath sounds clear to auscultation     Abdominal - normal exam     Neurological - normal exam             /83   Pulse 64   Temp 36.3 °C (97.3 °F) (Temporal)   Resp 18   Ht 1.549 m (5' 1\")   Wt 70.7 kg (155 lb 13.8 oz)   SpO2 93%   BMI 29.45 kg/m²     No visits with results within 6 Month(s) from this visit.   Latest known visit with results is:   Hospital Outpatient Visit on 09/19/2020   Component Date Value Ref Range Status   • TSH 09/19/2020 5.440* 0.380 - 5.330 uIU/mL Final   • Free T-4 09/19/2020 1.20  0.93 - 1.70 ng/dL Final   • Cholesterol,Tot 09/19/2020 220* 100 - 199 mg/dL Final   • Triglycerides 09/19/2020 200* 0 - 149 mg/dL Final   • HDL 09/19/2020 45  >=40 mg/dL Final   • LDL 09/19/2020 135* <100 mg/dL Final   • Sodium 09/19/2020 135  135 - 145 mmol/L Final   • Potassium 09/19/2020 3.8  3.6 - 5.5 mmol/L Final   • Chloride 09/19/2020 100  96 - 112 mmol/L Final   • Co2 09/19/2020 22  20 - 33 mmol/L Final   • Anion Gap 09/19/2020 13.0  7.0 - 16.0 Final   • Glucose 09/19/2020 100* 65 - 99 mg/dL Final   • Bun 09/19/2020 10  8 - 22 mg/dL Final   • Creatinine 09/19/2020 0.68  0.50 - 1.40 mg/dL Final   • Calcium 09/19/2020 9.6  8.5 - 10.5 mg/dL Final   • AST(SGOT) 09/19/2020 31  12 - 45 U/L Final   • ALT(SGPT) 09/19/2020 32  2 - 50 U/L Final   • Alkaline Phosphatase 09/19/2020 110* 30 - 99 U/L Final   • Total Bilirubin 09/19/2020 0.2  0.1 - 1.5 mg/dL Final   • Albumin 09/19/2020 3.9  3.2 - 4.9 g/dL Final   • Total Protein 09/19/2020 7.1  6.0 - 8.2 g/dL Final   • Globulin 09/19/2020 3.2  1.9 - 3.5 g/dL " Final   • A-G Ratio 09/19/2020 1.2  g/dL Final   • GFR If  09/19/2020 >60  >60 mL/min/1.73 m 2 Final   • GFR If Non  09/19/2020 >60  >60 mL/min/1.73 m 2 Final     Lexiscan: FINDINGS:   There is no left ventricular dilatation.  Left ventricular perfusion is normal, with no defects either at stress or rest.     The LV wall motion is normal, with an ejection fraction above 70%.    Impression    1. Normal left ventricular perfusion, with no fixed or reversible defects.   2. Normal LV wall motion.  Ejection fraction above 70%.    Anesthesia Plan    ASA 2       Plan - general       Airway plan will be ETT          Induction: intravenous    Postoperative Plan: Postoperative administration of opioids is intended.    Pertinent diagnostic labs and testing reviewed    Informed Consent:    Anesthetic plan and risks discussed with patient.    Use of blood products discussed with: patient whom consented to blood products.

## 2021-06-25 NOTE — ED NOTES
Med rec complete per interview with pt at bedside and reconcile outside meds. Allergies reviewed. No abx use noted in the past 14 days.  Pt states that she was previously taking methotrexate 2.5 mg 3 tabs once a week and prednisone 5 mg 1 tab daily but stopped taking them about 3 weeks ago when she started experiencing stomach issues.

## 2021-06-25 NOTE — OP REPORT
DATE OF OPERATION: 6/25/2021    PREOPERATIVE DIAGNOSIS: right upper quadrant pain, cholelithiasis and acute cholecystitis.    POSTOPERATIVE DIAGNOSIS: acute cholecystitis and choledocholithiasis.    PROCEDURE PERFORMED: laparoscopic cholecystectomy and Intraoperative cholangiogram    SURGEON: Abdirashid Restrepo M.D.    ASSISTANT: Ish Tilley M.D.    ANESTHESIOLOGIST: Philip Morales M.D.    ANESTHESIA: General endotracheal anesthesia.    ASA CLASSIFICATION: II.    INDICATIONS: The patient is a 60 year-old woman with clinical and radiographic findings of acute cholecystitis and choledocholithiasis. She is taken to the operating room for planned laparoscopic cholecystectomy and Intraoperative cholangiogram     The complexity of the surgical procedure necessitated additional skilled operative assistance from another surgeon. The assistant was present during the entire operation. The surgical assistant performed the following: provided assistance with optimal surgical exposure of the operative field, provided high complexity, subspecialty decision making input, operated the camera for the laparoscopic portion of the procedure and assisted with the cholangiogram and closure.     FINDINGS: Gallbladder wall thickening and acute inflammation. Dilation of the common bile duct. Multiple gallstones. Intraoperative cholangiography demonstrated dilated biliary ducts with multiple mid common bile duct filling defects. No contrast extravasation.    WOUND CLASSIFICATION: Class IV, Dirty or Infected.    SPECIMEN: Gallbladder.    ESTIMATED BLOOD LOSS: 50 mL.    PROCEDURE: Following informed consent consent, the patient was properly identified, taken to the operating room and placed in supine position where general endotracheal anesthesia was administered. Intravenous antibiotics were administered in the preoperative setting within the correct time interval. The patient voided prior to surgery. A urinary catheter was not placed.  Sequential compression devices were employed. The abdomen was prepped and draped into a sterile field.     Marcaine 0.5% was used to infiltrate the port sites. A 5 mm infraumbilical midline incision was made and the subcutaneous tissues spread bluntly. The fascia was elevated with a hook and a Veress needle was atraumatically inserted. Carbon dioxide pneumoperitoneum was instilled. A 5 mm separator port was passed. A 5 mm 30 degree lens and camera was passed into the peritoneal cavity. An 11 mm port was placed in the epigastric midline under direct vision. Two 5 mm right subcostal ports were placed under direct vision.     The gallbladder was identified and elevated. Dissection was carried out to completely expose and delineate the hepatocystic triangle. The critical view was achieved definitively identifying the single cystic duct and single cystic artery entering the gallbladder. The cystic artery was multiply clipped proximally, once distally and divided. The cystic duct was clipped distally and a proximal ductotomy made with hook scissors. A pre-flushed cholangiocatheter was passed percutaneously through a 2 mm right subcostal incision. The ductotomy was cannulated and a transcystic cholangiogram was performed under fluoroscopy. Findings are detailed above. The cholangiocatheter was withdrawn. The proximal cystic duct was secured with multiple clips and divided. The gallbladder was dissected free from the undersurface of the liver using electrocautery and placed within a laparoscopic specimen retrieval bag. The gallbladder was delivered intact from the abdominal cavity and submitted for pathology. The gallbladder fossa was irrigated. All excess irrigant was evacuated from the abdominal cavity. The gallbladder fossa was inspected. Hemostasis was controlled with application of Chiquita™ AH absorbable surgical hemostatic powder. Hemostasis was satisfactory. A 19 mm Nikolay drain was placed within the gallbladder fossa  and secured to the skin at the right lateral port site with a 3-0 non absorbable monofilament suture. The drain was connected to closed bulb suction.    The epigastric port site fascia was approximated using a trocar site closure device with a 0 VICRYL® Plus Antibacterial suture. The abdomen was desufflated and the ports were removed.  The port site skin incisions were closed with interrupted 4-0 VICRYL® Plus Antibacterial subcuticular sutures. Steri-Strips with Compound Benzoin Tincture were applied beneath Band-Aids.    The patient tolerated the procedure well, and there were no apparent complications. All sponge, needle, and instrument counts were correct on 2 separate occasions. The patient was awakened, extubated, and transferred to  to the post anesthesia care unit (PACU) in satisfactory condition.       ____________________________________     Abdirashid Restrepo M.D.    DD: 6/25/2021  2:51 PM

## 2021-06-25 NOTE — CARE PLAN
Problem: Knowledge Deficit - Standard  Goal: Patient and family/care givers will demonstrate understanding of plan of care, disease process/condition, diagnostic tests and medications  Outcome: Progressing  POC communicated. MRI pending today.    Mobility: progressing  Pt is able to ambulate with standby assist    Pain control: progressing  PRN pain meds available     The patient is Watcher - Medium risk of patient condition declining or worsening    Shift Goals  Clinical Goals: safety; rest and reduce anxiety regarding dx  Patient Goals: rest     Progress made toward(s) clinical / shift goals:  yes

## 2021-06-25 NOTE — ANESTHESIA PROCEDURE NOTES
Airway    Date/Time: 6/25/2021 1:36 PM  Performed by: Philip Morales M.D.  Authorized by: Philip Morales M.D.     Location:  OR  Urgency:  Elective  Difficult Airway: No    Indications for Airway Management:  Anesthesia      Spontaneous Ventilation: absent    Sedation Level:  Deep  Preoxygenated: Yes    Patient Position:  Sniffing  Mask Difficulty Assessment:  0 - not attempted  Final Airway Type:  Endotracheal airway  Final Endotracheal Airway:  ETT  Cuffed: Yes    Technique Used for Successful ETT Placement:  Video laryngoscopy  Devices/Methods Used in Placement:  Intubating stylet    Insertion Site:  Oral  Blade Type:  Glide  Laryngoscope Blade/Videolaryngoscope Blade Size:  3  ETT Size (mm):  6.0  Measured from:  Lips  ETT to Lips (cm):  20  Placement Verified by: auscultation and capnometry    Cormack-Lehane Classification:  Grade I - full view of glottis  Number of Attempts at Approach:  1  Number of Other Approaches Attempted:  0

## 2021-06-25 NOTE — PROGRESS NOTES
Hospital Medicine Daily Progress Note    Date of Service  6/25/2021    Chief Complaint  60 y.o. female admitted 6/24/2021 with abdominal pain     Hospital Course  60-year-old female with past medical history of hypertension and rheumatoid arthritis presenting with abdominal pain.  She was found to have calculus cholecystitis on ultrasound.      Interval Problem Update  Pain currently is ok at rest  I have consulted general surgery Dr. Restrepo and pt to OR this afternoon      Consultants/Specialty  General surgery    Code Status  Full Code    Disposition  Anticipate dc home tomorrow    Review of Systems  Review of Systems   Constitutional: Negative for chills and fever.   Respiratory: Negative for cough and shortness of breath.    Cardiovascular: Negative for chest pain and palpitations.   Gastrointestinal: Positive for abdominal pain. Negative for nausea and vomiting.   Genitourinary: Negative for dysuria and urgency.   Neurological: Negative for dizziness and headaches.   All other systems reviewed and are negative.       Physical Exam  Temp:  [35.8 °C (96.5 °F)-36.5 °C (97.7 °F)] 36.3 °C (97.3 °F)  Pulse:  [64-99] 64  Resp:  [13-31] 18  BP: (128-227)/(73-99) 151/83  SpO2:  [93 %-100 %] 93 %    Physical Exam  Vitals and nursing note reviewed.   Constitutional:       Appearance: Normal appearance.   Cardiovascular:      Rate and Rhythm: Normal rate and regular rhythm.   Pulmonary:      Effort: Pulmonary effort is normal.      Breath sounds: Normal breath sounds.   Skin:     General: Skin is warm and dry.   Neurological:      General: No focal deficit present.      Mental Status: She is alert and oriented to person, place, and time.         Fluids  No intake or output data in the 24 hours ending 06/25/21 1317    Laboratory  Recent Labs     06/24/21  1607 06/25/21  0324   WBC 5.0 4.8   RBC 4.62 4.50   HEMOGLOBIN 10.4* 10.1*   HEMATOCRIT 35.6* 34.2*   MCV 77.1* 76.0*   MCH 22.5* 22.4*   MCHC 29.2* 29.5*   RDW 56.9* 55.9*    PLATELETCT 555* 503*   MPV 10.2 9.9     Recent Labs     06/24/21  1607 06/25/21  0324   SODIUM 141 141   POTASSIUM 4.1 3.3*   CHLORIDE 107 109   CO2 23 21   GLUCOSE 124* 103*   BUN 4* 4*   CREATININE 0.72 0.57   CALCIUM 9.7 9.1                   Imaging  [id number]    (Results Pending)        Assessment/Plan  Arthritis- (present on admission)  Assessment & Plan  rheumatoid arthritis.   Continue home medications.       Cholecystitis- (present on admission)  Assessment & Plan  US +, also showing possible CBD dilatation. Bili is wnl pain is resolved.   gen surg consulted  Plan for kaylah with intraop cholangiogram on 6/25    HTN (hypertension)- (present on admission)  Assessment & Plan  Uncontrolled HTN bp in the 200's   Will restart her on her po medication since she did not take them today.   PRN bp meds        VTE prophylaxis: SCDs

## 2021-06-25 NOTE — ANESTHESIA POSTPROCEDURE EVALUATION
Patient: Sushma Kumari Chawla    Procedure Summary     Date: 06/25/21 Room / Location: [place] [id number] / SURGERY [facility]    Anesthesia Start: 1331 Anesthesia Stop: 1459    Procedures:       CHOLECYSTECTOMY, LAPAROSCOPIC (N/A Abdomen)      CHOLANGIOGRAM (N/A Abdomen) Diagnosis: (ACUTE CHOLEDOCHOLITHIASIS)    Surgeons: Abdirashid Restrepo M.D. Responsible Provider: Philip Morales M.D.    Anesthesia Type: general ASA Status: 2          Final Anesthesia Type: general  Last vitals  BP   Blood Pressure: 146/66    Temp   35.8 °C (96.5 °F)    Pulse   78   Resp   15    SpO2   93 %      Anesthesia Post Evaluation    Patient location during evaluation: PACU  Patient participation: complete - patient participated  Level of consciousness: awake and alert  Pain score: 2    Airway patency: patent  Anesthetic complications: no  Cardiovascular status: hemodynamically stable  Respiratory status: acceptable  Hydration status: euvolemic    PONV: none          No complications documented.

## 2021-06-25 NOTE — HOSPITAL COURSE
60-year-old female with past medical history of hypertension and rheumatoid arthritis presenting with abdominal pain.  She was found to have calculus cholecystitis on ultrasound.  She underwent laparoscopic cholecystectomy with intraoperative cholangiogram on 6/25 which revealed choledocholithiasis.  GI has been consulted with plans for ERCP on 6/26.

## 2021-06-25 NOTE — ED PROVIDER NOTES
ED Provider Note    Scribed for Johanny Capellan M.D. by Surjit Trejo. 6/24/2021, 6:27 PM.    Primary care provider: Howard Figueroa M.D.  Means of arrival: Walk-in  History obtained from: Patient  History limited by: None    CHIEF COMPLAINT  Chief Complaint   Patient presents with   • Sent by MD     seen by PCP, u/s completed and found that she has gallbladder disease.    • Abdominal Pain     started 6/10 and has improved since being seen at , pt has been eating very much.         HPI  Sushma Kumari Chawla is a 60 y.o. female who presents to the Emergency Department for further evaluation of abdominal pain onset several weeks prior to arrival. She was seen by urgent care on 6/11 for significant abdominal pain, nausea and vomiting.  She subsequently followed up with her primary care physician on 6/17/2021, however her symptoms at that time were improving.  She had an ultrasound completed today and was sent here for further evaluation. The pain has been improving, but there is still a burning sensation present when she eats.  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  Her doctor started her on pantoprazole without much relief of the discomfort.  She denies any nausea, vomiting, diarrhea, or fever. She was also started on pantoprazole. Prior surgical history includes a hysterectomy.     Chart review demonstrates the ultrasound today showed calculus cholecystis with enlarged common bile duct, concerning for an obstructing biliary pathology. No pericholecystic fluid present. Gall bladder wall measured 0.77 cm. S    REVIEW OF SYSTEMS  Review of Systems   Constitutional: Negative for fever.   Respiratory: Negative for shortness of breath.    Cardiovascular: Negative for chest pain.   Gastrointestinal: Positive for abdominal pain (with eating). Negative for diarrhea, nausea and vomiting.   All other systems reviewed and are negative.      PAST MEDICAL HISTORY   has a past medical history of Arthritis, GERD  "(gastroesophageal reflux disease), and Hypertension.    SURGICAL HISTORY   has a past surgical history that includes hysterectomy laparoscopy.    SOCIAL HISTORY  Social History     Tobacco Use   • Smoking status: Never Smoker   • Smokeless tobacco: Never Used   Substance Use Topics   • Alcohol use: No   • Drug use: No      Social History     Substance and Sexual Activity   Drug Use No       FAMILY HISTORY  History reviewed. No pertinent family history.    CURRENT MEDICATIONS  Home Medications     Reviewed by Oma Cruz R.N. (Registered Nurse) on 06/24/21 at 1536  Med List Status: Partial   Medication Last Dose Status   esomeprazole (NEXIUM) 20 MG capsule  Active   famotidine (PEPCID) 40 MG TABS  Active   hydroxychloroquine (PLAQUENIL) 200 MG TABS  Active   levothyroxine (SYNTHROID) 25 MCG Tab  Active   lisinopril (PRINIVIL) 20 MG Tab  Active   methotrexate 2.5 MG Tab  Active   pregabalin (LYRICA) 75 MG Cap  Active   verapamil (COVERA HS) 180 MG (CO) CR tablet  Active                ALLERGIES  Allergies   Allergen Reactions   • Atenolol    • Darvon [Propoxyphene Hcl]        PHYSICAL EXAM  VITAL SIGNS: /87   Pulse 79   Temp 36.3 °C (97.4 °F) (Temporal)   Resp 14   Ht 1.549 m (5' 1\")   Wt 71.2 kg (156 lb 15.5 oz)   SpO2 97%   BMI 29.66 kg/m²   Vitals reviewed by myself.  Nursing note and vitals reviewed.  Constitutional: Well-developed and well-nourished. No acute distress.   HENT: Head is normocephalic and atraumatic.  Eyes: extra-ocular movements intact  Cardiovascular: Regular rate and regular rhythm. No murmur heard.  Pulmonary/Chest: Breath sounds normal. No wheezes or rales.   Abdominal: Soft and non-tender. No distention. Negative Capellan's sign.   Musculoskeletal: Extremities exhibit normal range of motion without edema or tenderness.   Neurological: Awake and alert  Skin: Skin is warm and dry. No rash.         DIAGNOSTIC STUDIES /  LABS  Labs Reviewed   CBC WITH DIFFERENTIAL - Abnormal; " Notable for the following components:       Result Value    Hemoglobin 10.4 (*)     Hematocrit 35.6 (*)     MCV 77.1 (*)     MCH 22.5 (*)     MCHC 29.2 (*)     RDW 56.9 (*)     Platelet Count 555 (*)     All other components within normal limits   COMP METABOLIC PANEL - Abnormal; Notable for the following components:    Glucose 124 (*)     Bun 4 (*)     Alkaline Phosphatase 149 (*)     Globulin 4.0 (*)     All other components within normal limits   LIPASE   ESTIMATED GFR   PERIPHERAL SMEAR REVIEW   PLATELET ESTIMATE   MORPHOLOGY   DIFFERENTIAL COMMENT   SARS-COV ANTIGEN YIMI    Narrative:     Have you been in close contact with a person who is suspected  or known to be positive for COVID-19 within the last 30 days  (e.g. last seen that person < 30 days ago)->No   SARS-COV-2, PCR (IN-HOUSE)    Narrative:     Have you been in close contact with a person who is suspected  or known to be positive for COVID-19 within the last 30 days  (e.g. last seen that person < 30 days ago)->No   URINALYSIS     All labs reviewed by me.      REASSESSMENT    6:27 PM - Patient seen and examined at bedside. Discussed plan of care, including need for admission for further imaging. Patient agrees to the plan of care. Ordered for labs to evaluate her symptoms.     6:54 PM - I discussed the patient's case and the above findings with Dr. Dozier (Hospitalist) who agrees to evaluate the patient for hospitalization.      COURSE & MEDICAL DECISION MAKING  Nursing notes, VS, PMSFHx reviewed in chart.    Patient is a 60-year-old female who comes in for evaluation of abnormal ultrasound and abdominal pain.  Differential diagnosis includes cholecystitis, cholelithiasis, choledocholithiasis.  Ultrasound reviewed and demonstrates calculus cholecystitis with concern for obstructive biliary pathology given enlarged common bile duct.  Currently patient is asymptomatic except for when she eats and has a normal white count and there is no pericholecystic  fluid on ultrasound.  Therefore I believe acute infectious cholecystitis is less likely I do not believe antibiotics are indicated at this time.  I do believe she may have choledocholithiasis causing her food phobia and abdominal pain and will hospitalize her for MRCP and further work-up and management.  Patient is amenable to this plan.  Discussed the case with Dr. Young who has accepted patient for hospitalization.  Patient is in guarded condition.      DISPOSITION:  Patient will be hospitalized by Dr. Dozier in guarded condition.    FINAL IMPRESSION  1. Extrahepatic obstructive biliary disease    2. Epigastric pain          Surjit SIM (Scriblucy), am scribing for, and in the presence of, Johanny Capellan M.D..    Electronically signed by: Surjit Trejo (Mild[place]), 6/24/2021    Johanny SIM M.D. personally performed the services described in this documentation, as scribed by Surjit Trejo in my presence, and it is both accurate and complete.    The note accurately reflects work and decisions made by me.  Johanny Capellan M.D.  6/24/2021  7:11 PM

## 2021-06-25 NOTE — ED TRIAGE NOTES
Reassessed pt.  Pt remains pain free, no new complaints at this time.  Apologized for wait times.  Pt verbalized understanding.

## 2021-06-25 NOTE — H&P
Hospital Medicine History & Physical Note    Date of Service  6/24/2021    Primary Care Physician  Howard Figueroa M.D.    Consultants  none    Code Status  Full Code    Chief Complaint  Chief Complaint   Patient presents with   • Sent by MD     seen by PCP, u/s completed and found that she has gallbladder disease.    • Abdominal Pain     started 6/10 and has improved since being seen at , pt has been eating very much.         History of Presenting Illness  60 y.o. female who presented 6/24/2021 with pmh of htn, RA is coming today c/o abdominal pain started several weeks ago, patient states that pain is epigastric and right upper quadrant, pain is associated with nausea and vomiting, pain is worse with food, no radiated, intermittent, severe.  Patient stated that she went to urgent care on 6/11/2021 and she received antinausea medication and she was sent back home, patient has not been able to tolerate diet she is only eating soft food, she went to see her primary care doctor who ordered an ultrasound of her abdomen ultrasound came back positive for possible cholecystitis with stone and also common bile duct dilatation, patient was advised to come to hospital for evaluation, when I saw patient she is alert oriented follows commands she stated pain is gone right now, she denies any fever chills no diarrhea, she is scared of eating due to pain and nausea with vomiting, she denies any focal weakness no numbness no tingling, patient stated that she has not taken her blood pressure medications today and her blood pressure was in the 220s she believes this is related to anxiety of being here in the hospital since her blood pressure is very well controlled at home, patient had mildly elevated alkaline phosphate but rest of the liver function tests are normal no leukocytosis, patient is going to be admitted for MRCP, pending on findings will need to contact GI and general surgery, patient and patient  have expressed  understanding of his plan of care and agree with either question have been answered.    Review of Systems  Review of Systems   Constitutional: Negative for chills and fever.   HENT: Negative for congestion and nosebleeds.    Eyes: Negative for blurred vision and double vision.   Respiratory: Negative for cough, hemoptysis and wheezing.    Cardiovascular: Negative for chest pain, palpitations, claudication, leg swelling and PND.   Gastrointestinal: Positive for abdominal pain. Negative for diarrhea, heartburn, nausea and vomiting.   Genitourinary: Negative for hematuria and urgency.   Musculoskeletal: Negative for back pain and myalgias.   Skin: Negative for rash.   Neurological: Negative for dizziness and headaches.   Endo/Heme/Allergies: Does not bruise/bleed easily.   Psychiatric/Behavioral: Negative for depression.       Past Medical History   has a past medical history of Arthritis, GERD (gastroesophageal reflux disease), and Hypertension.    Surgical History   has a past surgical history that includes hysterectomy laparoscopy.     Family History  Positive for diabetes    Social History   reports that she has never smoked. She has never used smokeless tobacco. She reports that she does not drink alcohol and does not use drugs.    Allergies  Allergies   Allergen Reactions   • Atenolol    • Darvon [Propoxyphene Hcl]        Medications  Prior to Admission Medications   Prescriptions Last Dose Informant Patient Reported? Taking?   amLODIPine (NORVASC) 5 MG Tab   Yes No   Sig: Take 5 mg by mouth.   esomeprazole (NEXIUM) 20 MG capsule   Yes No   Sig: Take 20 mg by mouth every morning before breakfast.   famotidine (PEPCID) 40 MG TABS   Yes No   Sig: Take 40 mg by mouth 2 times a day.   hydroxychloroquine (PLAQUENIL) 200 MG TABS   Yes No   Sig: Take 400 mg by mouth every day.   levothyroxine (SYNTHROID) 25 MCG Tab   Yes No   Sig: Take 25 mcg by mouth Every morning on an empty stomach.   lisinopril (PRINIVIL) 20 MG Tab    Yes No   Sig: Take 20 mg by mouth every day.   methotrexate 2.5 MG Tab   Yes No   Sig: Take 7.5 mg by mouth every 7 days.   pantoprazole (PROTONIX) 40 MG Tablet Delayed Response   Yes No   Sig: Take 40 mg by mouth.   pregabalin (LYRICA) 75 MG Cap   Yes No   Sig: Take 75 mg by mouth 3 times a day.   verapamil (COVERA HS) 180 MG (CO) CR tablet   Yes No   Sig: Take 180 mg by mouth every day.      Facility-Administered Medications: None       Physical Exam  Temp:  [36.3 °C (97.4 °F)] 36.3 °C (97.4 °F)  Pulse:  [65-83] 68  Resp:  [13-20] 18  BP: (146-204)/(85-99) 204/99  SpO2:  [96 %-100 %] 96 %    Physical Exam  Vitals and nursing note reviewed.   Constitutional:       General: She is not in acute distress.     Appearance: Normal appearance.   HENT:      Head: Normocephalic.      Nose: Nose normal.   Eyes:      General:         Right eye: No discharge.         Left eye: No discharge.      Conjunctiva/sclera: Conjunctivae normal.      Pupils: Pupils are equal, round, and reactive to light.   Cardiovascular:      Rate and Rhythm: Normal rate and regular rhythm.      Pulses: Normal pulses.      Heart sounds: Normal heart sounds.   Pulmonary:      Effort: Pulmonary effort is normal.      Breath sounds: Normal breath sounds.   Abdominal:      General: Bowel sounds are normal. There is no distension.      Palpations: Abdomen is soft.      Tenderness: There is abdominal tenderness. There is no guarding or rebound.   Musculoskeletal:         General: Normal range of motion.      Cervical back: Normal range of motion and neck supple.      Right lower leg: No edema.      Left lower leg: No edema.   Skin:     General: Skin is warm and dry.      Capillary Refill: Capillary refill takes less than 2 seconds.      Coloration: Skin is not jaundiced.   Neurological:      General: No focal deficit present.      Mental Status: She is alert and oriented to person, place, and time.      Cranial Nerves: No cranial nerve deficit.    Psychiatric:         Mood and Affect: Mood normal.         Laboratory:  Recent Labs     06/24/21  1607   WBC 5.0   RBC 4.62   HEMOGLOBIN 10.4*   HEMATOCRIT 35.6*   MCV 77.1*   MCH 22.5*   MCHC 29.2*   RDW 56.9*   PLATELETCT 555*   MPV 10.2     Recent Labs     06/24/21  1607   SODIUM 141   POTASSIUM 4.1   CHLORIDE 107   CO2 23   GLUCOSE 124*   BUN 4*   CREATININE 0.72   CALCIUM 9.7     Recent Labs     06/24/21  1607   ALTSGPT 21   ASTSGOT 28   ALKPHOSPHAT 149*   TBILIRUBIN 0.2   LIPASE 32   GLUCOSE 124*         No results for input(s): NTPROBNP in the last 72 hours.      No results for input(s): TROPONINT in the last 72 hours.    Imaging:  [id number]    (Results Pending)         Assessment/Plan:  I anticipate this patient will require at least two midnights for appropriate medical management, necessitating inpatient admission.    Arthritis- (present on admission)  Assessment & Plan  rheumatoid arthritis.   Continue home medications.       Cholecystitis- (present on admission)  Assessment & Plan  US +, also showing possible CBD dilatation. Bili is wnl pain is resolved.   Will get MRCP, pending on MRCP result will need to consult GI and surgery.   Continue supportive treatment  Holding on abx for now since no leukocytosis, pain is resolved and fever.     HTN (hypertension)- (present on admission)  Assessment & Plan  Uncontrolled HTN bp in the 200's   Will restart her on her po medication since she did not take them today.   PRN bp meds       DVT prophylaxis SCDs

## 2021-06-25 NOTE — PROGRESS NOTES
A&Ox4.  Denies N/V. Denies numbness/tingling. Denies chest pain/SOB.  Reporting no pain at this time.  Ambulates with standby assist.   NPO at this time.  + void. + flatus.  No drains.  Skin assessment documented.  SCDs refused; pt states she has restless legs so prefers to not have SCDs on.

## 2021-06-26 ENCOUNTER — ANESTHESIA EVENT (OUTPATIENT)
Dept: SURGERY | Facility: MEDICAL CENTER | Age: 61
DRG: 419 | End: 2021-06-26

## 2021-06-26 ENCOUNTER — ANESTHESIA (OUTPATIENT)
Dept: SURGERY | Facility: MEDICAL CENTER | Age: 61
DRG: 419 | End: 2021-06-26

## 2021-06-26 ENCOUNTER — APPOINTMENT (OUTPATIENT)
Dept: RADIOLOGY | Facility: MEDICAL CENTER | Age: 61
DRG: 419 | End: 2021-06-26
Attending: INTERNAL MEDICINE

## 2021-06-26 LAB
ALBUMIN SERPL BCP-MCNC: 3.4 G/DL (ref 3.2–4.9)
ALBUMIN/GLOB SERPL: 1.1 G/DL
ALP SERPL-CCNC: 117 U/L (ref 30–99)
ALT SERPL-CCNC: 29 U/L (ref 2–50)
ANION GAP SERPL CALC-SCNC: 8 MMOL/L (ref 7–16)
AST SERPL-CCNC: 57 U/L (ref 12–45)
BILIRUB SERPL-MCNC: 0.2 MG/DL (ref 0.1–1.5)
BUN SERPL-MCNC: 7 MG/DL (ref 8–22)
CALCIUM SERPL-MCNC: 8.7 MG/DL (ref 8.5–10.5)
CHLORIDE SERPL-SCNC: 109 MMOL/L (ref 96–112)
CO2 SERPL-SCNC: 21 MMOL/L (ref 20–33)
CREAT SERPL-MCNC: 0.63 MG/DL (ref 0.5–1.4)
ERYTHROCYTE [DISTWIDTH] IN BLOOD BY AUTOMATED COUNT: 55.2 FL (ref 35.9–50)
GLOBULIN SER CALC-MCNC: 3.2 G/DL (ref 1.9–3.5)
GLUCOSE SERPL-MCNC: 147 MG/DL (ref 65–99)
HCT VFR BLD AUTO: 30.8 % (ref 37–47)
HGB BLD-MCNC: 9.2 G/DL (ref 12–16)
INR PPP: 1.19 (ref 0.87–1.13)
MCH RBC QN AUTO: 22.7 PG (ref 27–33)
MCHC RBC AUTO-ENTMCNC: 29.9 G/DL (ref 33.6–35)
MCV RBC AUTO: 75.9 FL (ref 81.4–97.8)
PLATELET # BLD AUTO: 498 K/UL (ref 164–446)
PMV BLD AUTO: 9.9 FL (ref 9–12.9)
POTASSIUM SERPL-SCNC: 3.9 MMOL/L (ref 3.6–5.5)
PROT SERPL-MCNC: 6.6 G/DL (ref 6–8.2)
PROTHROMBIN TIME: 14.7 SEC (ref 12–14.6)
RBC # BLD AUTO: 4.06 M/UL (ref 4.2–5.4)
SODIUM SERPL-SCNC: 138 MMOL/L (ref 135–145)
WBC # BLD AUTO: 10.3 K/UL (ref 4.8–10.8)

## 2021-06-26 PROCEDURE — 160208 HCHG ENDO MINUTES - EA ADDL 1 MIN LEVEL 4: Performed by: INTERNAL MEDICINE

## 2021-06-26 PROCEDURE — 160009 HCHG ANES TIME/MIN: Performed by: INTERNAL MEDICINE

## 2021-06-26 PROCEDURE — 700101 HCHG RX REV CODE 250: Performed by: ANESTHESIOLOGY

## 2021-06-26 PROCEDURE — 502240 HCHG MISC OR SUPPLY RC 0272: Performed by: INTERNAL MEDICINE

## 2021-06-26 PROCEDURE — 85610 PROTHROMBIN TIME: CPT

## 2021-06-26 PROCEDURE — 160048 HCHG OR STATISTICAL LEVEL 1-5: Performed by: INTERNAL MEDICINE

## 2021-06-26 PROCEDURE — 80053 COMPREHEN METABOLIC PANEL: CPT

## 2021-06-26 PROCEDURE — 0FC98ZZ EXTIRPATION OF MATTER FROM COMMON BILE DUCT, VIA NATURAL OR ARTIFICIAL OPENING ENDOSCOPIC: ICD-10-PCS | Performed by: INTERNAL MEDICINE

## 2021-06-26 PROCEDURE — 160002 HCHG RECOVERY MINUTES (STAT): Performed by: INTERNAL MEDICINE

## 2021-06-26 PROCEDURE — 700102 HCHG RX REV CODE 250 W/ 637 OVERRIDE(OP): Performed by: STUDENT IN AN ORGANIZED HEALTH CARE EDUCATION/TRAINING PROGRAM

## 2021-06-26 PROCEDURE — 700102 HCHG RX REV CODE 250 W/ 637 OVERRIDE(OP): Performed by: HOSPITALIST

## 2021-06-26 PROCEDURE — 160203 HCHG ENDO MINUTES - 1ST 30 MINS LEVEL 4: Performed by: INTERNAL MEDICINE

## 2021-06-26 PROCEDURE — 36415 COLL VENOUS BLD VENIPUNCTURE: CPT

## 2021-06-26 PROCEDURE — A9270 NON-COVERED ITEM OR SERVICE: HCPCS | Performed by: HOSPITALIST

## 2021-06-26 PROCEDURE — 85027 COMPLETE CBC AUTOMATED: CPT

## 2021-06-26 PROCEDURE — 700117 HCHG RX CONTRAST REV CODE 255: Performed by: INTERNAL MEDICINE

## 2021-06-26 PROCEDURE — 700111 HCHG RX REV CODE 636 W/ 250 OVERRIDE (IP): Performed by: ANESTHESIOLOGY

## 2021-06-26 PROCEDURE — 770001 HCHG ROOM/CARE - MED/SURG/GYN PRIV*

## 2021-06-26 PROCEDURE — 700102 HCHG RX REV CODE 250 W/ 637 OVERRIDE(OP): Performed by: INTERNAL MEDICINE

## 2021-06-26 PROCEDURE — 700105 HCHG RX REV CODE 258: Performed by: HOSPITALIST

## 2021-06-26 PROCEDURE — 700105 HCHG RX REV CODE 258: Performed by: ANESTHESIOLOGY

## 2021-06-26 PROCEDURE — 110371 HCHG SHELL REV 272: Performed by: INTERNAL MEDICINE

## 2021-06-26 PROCEDURE — 99232 SBSQ HOSP IP/OBS MODERATE 35: CPT | Performed by: HOSPITALIST

## 2021-06-26 PROCEDURE — A9270 NON-COVERED ITEM OR SERVICE: HCPCS | Performed by: STUDENT IN AN ORGANIZED HEALTH CARE EDUCATION/TRAINING PROGRAM

## 2021-06-26 PROCEDURE — 160035 HCHG PACU - 1ST 60 MINS PHASE I: Performed by: INTERNAL MEDICINE

## 2021-06-26 PROCEDURE — A9270 NON-COVERED ITEM OR SERVICE: HCPCS | Performed by: INTERNAL MEDICINE

## 2021-06-26 RX ORDER — OXYCODONE HCL 5 MG/5 ML
10 SOLUTION, ORAL ORAL
Status: DISCONTINUED | OUTPATIENT
Start: 2021-06-26 | End: 2021-06-26 | Stop reason: HOSPADM

## 2021-06-26 RX ORDER — LIDOCAINE HYDROCHLORIDE 40 MG/ML
SOLUTION TOPICAL PRN
Status: DISCONTINUED | OUTPATIENT
Start: 2021-06-26 | End: 2021-06-26 | Stop reason: SURG

## 2021-06-26 RX ORDER — SODIUM CHLORIDE, SODIUM LACTATE, POTASSIUM CHLORIDE, CALCIUM CHLORIDE 600; 310; 30; 20 MG/100ML; MG/100ML; MG/100ML; MG/100ML
INJECTION, SOLUTION INTRAVENOUS CONTINUOUS
Status: DISCONTINUED | OUTPATIENT
Start: 2021-06-26 | End: 2021-06-26 | Stop reason: HOSPADM

## 2021-06-26 RX ORDER — LABETALOL HYDROCHLORIDE 5 MG/ML
5 INJECTION, SOLUTION INTRAVENOUS
Status: DISCONTINUED | OUTPATIENT
Start: 2021-06-26 | End: 2021-06-26 | Stop reason: HOSPADM

## 2021-06-26 RX ORDER — OXYCODONE HCL 5 MG/5 ML
5 SOLUTION, ORAL ORAL
Status: DISCONTINUED | OUTPATIENT
Start: 2021-06-26 | End: 2021-06-26 | Stop reason: HOSPADM

## 2021-06-26 RX ORDER — KETOROLAC TROMETHAMINE 30 MG/ML
INJECTION, SOLUTION INTRAMUSCULAR; INTRAVENOUS PRN
Status: DISCONTINUED | OUTPATIENT
Start: 2021-06-26 | End: 2021-06-26 | Stop reason: SURG

## 2021-06-26 RX ORDER — SIMETHICONE 80 MG
80 TABLET,CHEWABLE ORAL 3 TIMES DAILY PRN
Status: DISCONTINUED | OUTPATIENT
Start: 2021-06-26 | End: 2021-06-27 | Stop reason: HOSPADM

## 2021-06-26 RX ORDER — DEXAMETHASONE SODIUM PHOSPHATE 4 MG/ML
INJECTION, SOLUTION INTRA-ARTICULAR; INTRALESIONAL; INTRAMUSCULAR; INTRAVENOUS; SOFT TISSUE PRN
Status: DISCONTINUED | OUTPATIENT
Start: 2021-06-26 | End: 2021-06-26 | Stop reason: SURG

## 2021-06-26 RX ORDER — SODIUM CHLORIDE, SODIUM LACTATE, POTASSIUM CHLORIDE, CALCIUM CHLORIDE 600; 310; 30; 20 MG/100ML; MG/100ML; MG/100ML; MG/100ML
INJECTION, SOLUTION INTRAVENOUS
Status: DISCONTINUED | OUTPATIENT
Start: 2021-06-26 | End: 2021-06-26 | Stop reason: SURG

## 2021-06-26 RX ORDER — HYDRALAZINE HYDROCHLORIDE 20 MG/ML
5 INJECTION INTRAMUSCULAR; INTRAVENOUS
Status: DISCONTINUED | OUTPATIENT
Start: 2021-06-26 | End: 2021-06-26 | Stop reason: HOSPADM

## 2021-06-26 RX ORDER — LIDOCAINE HYDROCHLORIDE 20 MG/ML
INJECTION, SOLUTION EPIDURAL; INFILTRATION; INTRACAUDAL; PERINEURAL PRN
Status: DISCONTINUED | OUTPATIENT
Start: 2021-06-26 | End: 2021-06-26 | Stop reason: SURG

## 2021-06-26 RX ORDER — SUCCINYLCHOLINE CHLORIDE 20 MG/ML
INJECTION INTRAMUSCULAR; INTRAVENOUS PRN
Status: DISCONTINUED | OUTPATIENT
Start: 2021-06-26 | End: 2021-06-26 | Stop reason: SURG

## 2021-06-26 RX ORDER — INDOMETHACIN 50 MG/1
100 SUPPOSITORY RECTAL ONCE
Status: COMPLETED | OUTPATIENT
Start: 2021-06-26 | End: 2021-06-26

## 2021-06-26 RX ORDER — ONDANSETRON 2 MG/ML
INJECTION INTRAMUSCULAR; INTRAVENOUS PRN
Status: DISCONTINUED | OUTPATIENT
Start: 2021-06-26 | End: 2021-06-26 | Stop reason: SURG

## 2021-06-26 RX ORDER — DIPHENHYDRAMINE HYDROCHLORIDE 50 MG/ML
12.5 INJECTION INTRAMUSCULAR; INTRAVENOUS
Status: DISCONTINUED | OUTPATIENT
Start: 2021-06-26 | End: 2021-06-26 | Stop reason: HOSPADM

## 2021-06-26 RX ORDER — HALOPERIDOL 5 MG/ML
1 INJECTION INTRAMUSCULAR
Status: DISCONTINUED | OUTPATIENT
Start: 2021-06-26 | End: 2021-06-26 | Stop reason: HOSPADM

## 2021-06-26 RX ORDER — MAGNESIUM SULFATE HEPTAHYDRATE 40 MG/ML
INJECTION, SOLUTION INTRAVENOUS PRN
Status: DISCONTINUED | OUTPATIENT
Start: 2021-06-26 | End: 2021-06-26 | Stop reason: SURG

## 2021-06-26 RX ADMIN — ONDANSETRON 8 MG: 2 INJECTION INTRAMUSCULAR; INTRAVENOUS at 14:35

## 2021-06-26 RX ADMIN — LEVOTHYROXINE SODIUM 25 MCG: 0.03 TABLET ORAL at 05:58

## 2021-06-26 RX ADMIN — SIMETHICONE 80 MG: 80 TABLET, CHEWABLE ORAL at 23:06

## 2021-06-26 RX ADMIN — OXYCODONE 5 MG: 5 TABLET ORAL at 05:58

## 2021-06-26 RX ADMIN — OXYCODONE 2.5 MG: 5 TABLET ORAL at 16:30

## 2021-06-26 RX ADMIN — LIDOCAINE HYDROCHLORIDE 100 MG: 20 INJECTION, SOLUTION EPIDURAL; INFILTRATION; INTRACAUDAL at 13:46

## 2021-06-26 RX ADMIN — OMEPRAZOLE 20 MG: 20 CAPSULE, DELAYED RELEASE ORAL at 05:58

## 2021-06-26 RX ADMIN — PROPOFOL 150 MG: 10 INJECTION, EMULSION INTRAVENOUS at 13:51

## 2021-06-26 RX ADMIN — INDOMETHACIN 100 MG: 50 SUPPOSITORY RECTAL at 14:59

## 2021-06-26 RX ADMIN — VERAPAMIL HYDROCHLORIDE 180 MG: 180 TABLET, FILM COATED, EXTENDED RELEASE ORAL at 16:29

## 2021-06-26 RX ADMIN — MAGNESIUM SULFATE IN WATER 4 G: 40 INJECTION, SOLUTION INTRAVENOUS at 14:00

## 2021-06-26 RX ADMIN — PROPOFOL 50 MG: 10 INJECTION, EMULSION INTRAVENOUS at 14:35

## 2021-06-26 RX ADMIN — SODIUM CHLORIDE, POTASSIUM CHLORIDE, SODIUM LACTATE AND CALCIUM CHLORIDE: 600; 310; 30; 20 INJECTION, SOLUTION INTRAVENOUS at 13:45

## 2021-06-26 RX ADMIN — DEXAMETHASONE SODIUM PHOSPHATE 10 MG: 4 INJECTION, SOLUTION INTRA-ARTICULAR; INTRALESIONAL; INTRAMUSCULAR; INTRAVENOUS; SOFT TISSUE at 13:56

## 2021-06-26 RX ADMIN — LIDOCAINE HYDROCHLORIDE 4 ML: 40 SOLUTION TOPICAL at 13:51

## 2021-06-26 RX ADMIN — KETOROLAC TROMETHAMINE 30 MG: 30 INJECTION, SOLUTION INTRAMUSCULAR at 14:35

## 2021-06-26 RX ADMIN — SUCCINYLCHOLINE CHLORIDE 70.8 MG: 20 INJECTION, SOLUTION INTRAMUSCULAR; INTRAVENOUS; PARENTERAL at 13:51

## 2021-06-26 RX ADMIN — SODIUM CHLORIDE 1000 ML: 9 INJECTION, SOLUTION INTRAVENOUS at 06:00

## 2021-06-26 RX ADMIN — AMLODIPINE BESYLATE 5 MG: 10 TABLET ORAL at 05:58

## 2021-06-26 RX ADMIN — LISINOPRIL 20 MG: 20 TABLET ORAL at 05:58

## 2021-06-26 ASSESSMENT — ENCOUNTER SYMPTOMS
ABDOMINAL PAIN: 1
VOMITING: 0
CHILLS: 0
PALPITATIONS: 0
SHORTNESS OF BREATH: 0
HEADACHES: 0
COUGH: 0
DIZZINESS: 0
FEVER: 0
NAUSEA: 0

## 2021-06-26 ASSESSMENT — PAIN DESCRIPTION - PAIN TYPE
TYPE: ACUTE PAIN
TYPE: SURGICAL PAIN
TYPE: SURGICAL PAIN

## 2021-06-26 NOTE — OR NURSING
1440: Pt arrived from OR, handoff received from anesthesiologist and RN. Pt breathing even and unlabored.     1513: Call made to patient's  to update patient status.     1516: Handoff to Collette, RN. Transport requested.

## 2021-06-26 NOTE — ANESTHESIA PREPROCEDURE EVALUATION
59yo F with choledocholithiasis who is s/p a lap kaylah on 6/25 for cholecystitis, here for ERCP    Relevant Problems   CARDIAC   (positive) HTN (hypertension)      GI   (positive) GERD (gastroesophageal reflux disease)      Other   (positive) Arthritis       Physical Exam    Airway   Mallampati: III  TM distance: >3 FB  Neck ROM: full       Cardiovascular - normal exam  Rhythm: regular  Rate: normal  (-) murmur     Dental - normal exam           Pulmonary - normal exam  Breath sounds clear to auscultation     Abdominal    Neurological - normal exam               Anesthesia Plan    ASA 2- EMERGENT (biliary obstruction with stone)   ASA physical status emergent criteria: other (comment)    Plan - general       Airway plan will be ETT          Induction: intravenous    Postoperative Plan: Postoperative administration of opioids is intended.    Pertinent diagnostic labs and testing reviewed    Informed Consent:    Anesthetic plan and risks discussed with patient.    Use of blood products discussed with: patient whom consented to blood products.

## 2021-06-26 NOTE — ANESTHESIA POSTPROCEDURE EVALUATION
Patient: Sushma Kumari Chawla    Procedure Summary     Date: 06/26/21 Room / Location: [facility] OR 05 / SURGERY [facility]    Anesthesia Start: 1345 Anesthesia Stop: 1445    Procedures:       ERCP (ENDOSCOPIC RETROGRADE CHOLANGIOPANCREATOGRAPHY) - WITH STONE REMOVAL (N/A Esophagus)      ERCP,WITH CALCULUS REMOVAL FROM BILE OR PANCREATIC DUCT (N/A Abdomen) Diagnosis: (Abdominal pain, Cholelithiasis with choledocholithiasis, cbd stone)    Surgeons: Jori Crane M.D. Responsible Provider: Brenda Mcdonough M.D.    Anesthesia Type: general ASA Status: 2 - Emergent          Final Anesthesia Type: general  Last vitals  BP   Blood Pressure: 126/66    Temp   36.7 °C (98.1 °F)    Pulse   68   Resp   15    SpO2   97 %      Anesthesia Post Evaluation    Patient location during evaluation: PACU  Patient participation: complete - patient participated  Level of consciousness: awake and alert    Airway patency: patent  Anesthetic complications: no  Cardiovascular status: hemodynamically stable  Respiratory status: acceptable  Hydration status: euvolemic    PONV: none          No complications documented.     Nurse Pain Score: 0 (NPRS)

## 2021-06-26 NOTE — PROGRESS NOTES
Indication: Positive intraoperative cholangiogram.   Risks, benefits, and alternatives were discussed with with consenting person(s). Consenting person(s) were given an opportunity to ask questions and discuss other options. Risks including but not limited to failed or incomplete ERCP, stent migration, ineffective therapy, radiation exposure, pancreatitis (with potential future complications), contrast reaction, perforation, infection, bleeding, missed lesion(s), cardiac and/or pulmonary event, aspiration, stroke, possible need for surgery, hospitalization possibly prolonged, discomfort, unsuccessful and/or incomplete procedure, possible need for repeat procedures and/or additional testings, damage to adjacent organs and/or vascular structures, medication reaction, disability, death, and other adverse events possibly life-threatening. Discussion was undertaken with Layman's terms in presence of patient's . Consenting persons stated understanding and acceptance of these risks, and wished to proceed. Consent was given in clear state of mind.

## 2021-06-26 NOTE — CARE PLAN
The patient is Stable - Low risk of patient condition declining or worsening    Shift Goals  Clinical Goals: Ambulation, rest  Patient Goals: rest     Progress made toward(s) clinical / shift goals:  Pt had surgery today    Patient is not progressing towards the following goals:

## 2021-06-26 NOTE — PROCEDURES
Endoscopic Retrograde Cholangiopancreatography    Date of Procedure:  6/26/2021  Attending Physician:  Jori Crane MD  Indications: Positive intraoperative cholangiogram, choledocholithiasis      Instrument: Olympus Flexible Sideviewing Endoscope  Sedation:   Surgeon(s):  Jori Crane M.D.    Anesthesiologist/Type of Anesthesia:  Anesthesiologist: Brenda Mcdonough M.D./General    Surgical Staff:  Circulator: Alessia Chester R.N.  Endoscopy Technician: Sho Mejia    Pre-Anesthesia Assessment:  Prior to the procedure, a History and Physical was performed, and patient medications and allergies were reviewed. The patient’s tolerance of previous anesthesia was also reviewed. The risks and benefits of the procedure and the sedation options and risks were discussed with the patient including but not limited to infection, bleeding, aspiration, perforation, adverse medication reaction, missed diagnosis, missed lesions, and pancreatitis. The patient verbalized understanding. All questions were answered, and informed consent was obtained      After I obtained informed consent from the patient, the patient was placed in the prone/swimmer position. Appropriate time-out protocol was followed: the correct patient, the correct procedure, and the correct equipment in the room were confirmed. Throughout the procedure, the patient’s blood pressure, pulse, and oxygen saturations were monitored continuously. The Olymus flexible sideviewing duodenoscope was inserted through the oropharynx, esophagus intubated, then advanced to the gastrointestinal tract to the major papilla. The duct(s) were cannulated and contrast was injected I personally interpreted the ductal images.  Findings and interventions were performed and documented below. Air was then withdrawn and the duodenoscope was removed. The patient tolerated the procedure well. There were no immediate postoperative complications    Findings:     film demonstrated right  upper quadrant drain as well as potential surgical clips from previous cholecystectomy.  Scope inserted to second portion duodenum without difficulty.  Ampulla appeared normal.  Maintenance of scope position was difficult due to short position and patient habitus.  Multiple readjustment was necessary.  Cannulation with a 0.025 wire sphincterotome prolonged was not successful in entering any duct.  Due to pretest probability of choledocholithiasis with positive IOC, needle-knife sphincterotomy precut was performed.  This was cut towards the expected course of the bile duct.  Flashes of bile was noted.  Cannulation with a 0.025 wire sphincterotome on reattempt was then finally successful.  Wire was inserted advanced into the intrahepatic ducts.  An extension traction biliary sphincterotomy was then performed.  Scope had difficulty maintaining position due to short position.  Utilizing a 9-12 mm extractor balloon multiple balloon sweeps were performed with removal of 1 stone.  Stepwise occlusion cholangiogram demonstrated no extravasation contrast no filling defect.  Bilateral films under diaphragm was negative for free air.  Pancreatic duct was not cannulated or injected.    Please note this procedure was more difficult than usual due difficulty maintaining position due to patient's habitus the need for needle-knife precut    Impressions:   1.  Choledocholithiasis treated with ERCP, precut needle-knife sphincterotomy, ERCP traction extension sphincterotomy, balloon sweep with 9-12 mm extractor balloon.        Recommendations:   1.  Monitor for postprocedure complication including perforation bleeding pancreatitis  2.  Patient at slightly higher risk for post ERCP pancreatitis given gender age and needle-knife precut.  Indomethacin 100 mg suppository as well as additional lactated Ringer ordered  3.  Ideally avoid anticoagulation for 3 days due to sphincterotomy  4.  Trend LFTs  5.  N.p.o. for 4 hours except for ice  chips; after 4 hours if no significant pain advance diet to clears then advance as tolerated  6.  If worsening pain hold on advancement of diet check lipase as well as potential CT scan      NOTE: Radiologic interpretation of dynamic and static fluoroscopic imaging by myself.  At no time was/were a Radiologist present.     This note was generated using voice recognition software which has a small chance of producing errors of grammar and possibly content. I have made every reasonable attempt to find and correct any obvious errors, but expect that some may not be found prior to finalization of this note

## 2021-06-26 NOTE — PROGRESS NOTES
Spoke to patient and her  this morning. Went over ERCP procedure summary, risks and benefits with patient and her . All questions asked. Procedure still with tentative time with Dr. Crane today at around 11 AM.    Risks, benefits, and alternatives of aforementioned procedures were discussed with patient and  Michael.  Consenting person(s) were given opportunities to ask questions and discuss other options.  Risks including but not limited to perforation, infection, bleeding, missed lesion(s), possible need for surgery(ies) and/or interventional radiology, possible need for repeat procedure(s) and/or additional testing, hospitalization possibly prolonged, cardiac and/or pulmonary event, aspiration, hypoxia, stroke, medication and/or anesthesia reaction, indefinite diagnosis, discomfort, unsuccessful and/or incomplete procedure, ineffective therapy and/or persistent symptoms, damage to adjacent organs and/or vascular structures, and other adverse events possibly life-threatening.  Interactive discussion was undertaken with Layman's terms. I answered questions in full and to satisfaction.  Consenting person(s) stated understanding and acceptance of these risks, and wished to proceed.  Informed consent was given in clear state of mind.

## 2021-06-26 NOTE — PROGRESS NOTES
Received report from previous shift RN  Assessment complete.  A&O x 4. Patient calls appropriately.  Patient ambulates with SBA assist.   Patient has 4/10 pain. Pain managed with prescribed medications.  Denies N&V. NPO at this time  Surgical lap sites CDI, BRIAN drain with dressing CDI.  + void, + flatus, PTA BM.  Patient denies SOB.  SCD's on.  Patient scheduled for ERCP today.  Review plan with of care with patient. Call light and personal belongings with in reach. Hourly rounding in place. All needs met at this time.

## 2021-06-26 NOTE — PROGRESS NOTES
"    DATE: 6/26/2021    Post Operative Day  1 laparoscopic cholecystectomy.    Interval Events:  No significant events overnight.  Gastroenterology consultation.  Awaiting ERCP.    PHYSICAL EXAMINATION:  Vital Signs: /94   Pulse 81   Temp 36.9 °C (98.4 °F) (Temporal)   Resp 17   Ht 1.549 m (5' 1\")   Wt 70.7 kg (155 lb 13.8 oz)   SpO2 91%     The abdomen is soft and diffusely tender about the port sites.  Right upper quadrant drain with nonbilious output.    Laboratory Values:   Recent Labs     06/24/21  1607 06/25/21  0324 06/26/21  0443   WBC 5.0 4.8 10.3   RBC 4.62 4.50 4.06*   HEMOGLOBIN 10.4* 10.1* 9.2*   HEMATOCRIT 35.6* 34.2* 30.8*   MCV 77.1* 76.0* 75.9*   MCH 22.5* 22.4* 22.7*   MCHC 29.2* 29.5* 29.9*   RDW 56.9* 55.9* 55.2*   PLATELETCT 555* 503* 498*   MPV 10.2 9.9 9.9     Recent Labs     06/24/21  1607 06/25/21  0324 06/26/21  0443   SODIUM 141 141 138   POTASSIUM 4.1 3.3* 3.9   CHLORIDE 107 109 109   CO2 23 21 21   GLUCOSE 124* 103* 147*   BUN 4* 4* 7*   CREATININE 0.72 0.57 0.63   CALCIUM 9.7 9.1 8.7     Recent Labs     06/24/21  1607 06/25/21  0324 06/26/21  0443   ASTSGOT 28 25 57*   ALTSGPT 21 18 29   TBILIRUBIN 0.2 0.3 0.2   ALKPHOSPHAT 149* 131* 117*   GLOBULIN 4.0* 3.6* 3.2   INR  --   --  1.19*       ASSESSMENT AND PLAN:     Satisfactory progress on postoperative day #1 following laparoscopic cholecystectomy.  ERCP to definitively clear common bile duct.     ____________________________________     Abdirashid Restrepo M.D.    DD: 6/26/2021  8:46 AM      "

## 2021-06-26 NOTE — PROGRESS NOTES
[facility] Medicine Daily Progress Note    Date of Service  6/26/2021    Chief Complaint  60 y.o. female admitted 6/24/2021 with abdominal pain     Hospital Course  60-year-old female with past medical history of hypertension and rheumatoid arthritis presenting with abdominal pain.  She was found to have calculus cholecystitis on ultrasound.  She underwent laparoscopic cholecystectomy with intraoperative cholangiogram on 6/25 which revealed choledocholithiasis.  GI has been consulted with plans for ERCP on 6/26.      Interval Problem Update  Her abdominal pain is well controlled  Patient for ERCP today      Consultants/Specialty  General surgery    Code Status  Full Code    Disposition  Anticipate dc home tomorrow    Review of Systems  Review of Systems   Constitutional: Negative for chills and fever.   Respiratory: Negative for cough and shortness of breath.    Cardiovascular: Negative for chest pain and palpitations.   Gastrointestinal: Positive for abdominal pain. Negative for nausea and vomiting.   Genitourinary: Negative for dysuria and urgency.   Neurological: Negative for dizziness and headaches.   All other systems reviewed and are negative.       Physical Exam  Temp:  [36.2 °C (97.2 °F)-36.9 °C (98.4 °F)] 36.2 °C (97.2 °F)  Pulse:  [60-81] 69  Resp:  [12-22] 18  BP: (102-151)/(58-94) 128/63  SpO2:  [91 %-100 %] 91 %    Physical Exam  Vitals and nursing note reviewed.   Constitutional:       Appearance: Normal appearance.   Cardiovascular:      Rate and Rhythm: Normal rate and regular rhythm.   Pulmonary:      Effort: Pulmonary effort is normal.      Breath sounds: Normal breath sounds.   Skin:     General: Skin is warm and dry.   Neurological:      General: No focal deficit present.      Mental Status: She is alert and oriented to person, place, and time.         Fluids    Intake/Output Summary (Last 24 hours) at 6/26/2021 2121  Last data filed at 6/26/2021 1508  Gross per 24 hour   Intake 500 ml   Output 80  ml   Net 420 ml       Laboratory  Recent Labs     06/24/21  1607 06/25/21  0324 06/26/21  0443   WBC 5.0 4.8 10.3   RBC 4.62 4.50 4.06*   HEMOGLOBIN 10.4* 10.1* 9.2*   HEMATOCRIT 35.6* 34.2* 30.8*   MCV 77.1* 76.0* 75.9*   MCH 22.5* 22.4* 22.7*   MCHC 29.2* 29.5* 29.9*   RDW 56.9* 55.9* 55.2*   PLATELETCT 555* 503* 498*   MPV 10.2 9.9 9.9     Recent Labs     06/24/21  1607 06/25/21  0324 06/26/21  0443   SODIUM 141 141 138   POTASSIUM 4.1 3.3* 3.9   CHLORIDE 107 109 109   CO2 23 21 21   GLUCOSE 124* 103* 147*   BUN 4* 4* 7*   CREATININE 0.72 0.57 0.63   CALCIUM 9.7 9.1 8.7     Recent Labs     06/26/21  0443   INR 1.19*               Imaging  DX-PORTABLE FLUORO > 1 HOUR   Final Result      Intraoperative fluoroscopic spot images as described above.      [id number]   Final Result      9 seconds fluoroscopy time utilized for intraoperative cholangiogram.           Assessment/Plan  Arthritis- (present on admission)  Assessment & Plan  rheumatoid arthritis.   Continue home medications.       Cholecystitis- (present on admission)  Assessment & Plan  US +, also showing possible CBD dilatation. Bili is wnl pain is resolved.   gen surg consulted  s/p kaylah with intraop cholangiogram on 6/25 showing stones  ERCP to be done 6/26    HTN (hypertension)- (present on admission)  Assessment & Plan  Uncontrolled HTN bp in the 200's   Will restart her on her po medication since she did not take them today.   PRN bp meds        VTE prophylaxis: SCDs

## 2021-06-26 NOTE — ANESTHESIA PROCEDURE NOTES
Airway    Date/Time: 6/26/2021 1:51 PM  Performed by: Brenda Mcdonough M.D.  Authorized by: Brenda Mcdonough M.D.     Location:  OR  Urgency:  Elective  Indications for Airway Management:  Anesthesia      Spontaneous Ventilation: absent    Sedation Level:  Deep  Preoxygenated: Yes    Patient Position:  Sniffing  Mask Difficulty Assessment:  0 - not attempted  Final Airway Type:  Endotracheal airway  Final Endotracheal Airway:  ETT  Cuffed: Yes    Technique Used for Successful ETT Placement:  Direct laryngoscopy    Insertion Site:  Oral  Blade Type:  Bernarda  Laryngoscope Blade/Videolaryngoscope Blade Size:  3  ETT Size (mm):  6.5  Measured from:  Teeth  ETT to Teeth (cm):  21  Placement Verified by: auscultation and capnometry    Cormack-Lehane Classification:  Grade I - full view of glottis  Number of Attempts at Approach:  1

## 2021-06-26 NOTE — PROGRESS NOTES
2 RN skin check complete  Devices in place: SCDs to BLE, PIV, BRIAN drain to RLQ, and nasal cannula.  Skin under SCDs and nasal cannula intact and blanching  Ears, elbows, and heels - intact and blanching  Sacrum intact and blanching  Dressing with gauze and tape to RLQ for BRIAN drain with abd lap sites.  Confirmed pressure ulcers found: none  New potential pressure ulcers noted: n/a.

## 2021-06-26 NOTE — ANESTHESIA TIME REPORT
Anesthesia Start and Stop Event Times     Date Time Event    6/26/2021 1333 Ready for Procedure     1345 Anesthesia Start     1445 Anesthesia Stop        Responsible Staff  06/26/21    Name Role Begin End    Brenda Mcdonough M.D. Anesth 1345 1445        Preop Diagnosis (Free Text):  Pre-op Diagnosis     Abdominal pain, Cholelithiasis with choledocholithiasis        Preop Diagnosis (Codes):    Post op Diagnosis  UGIB (upper gastrointestinal bleed)      Premium Reason  E. Weekend    Comments:

## 2021-06-26 NOTE — CARE PLAN
Problem: Knowledge Deficit - Standard  Goal: Patient and family/care givers will demonstrate understanding of plan of care, disease process/condition, diagnostic tests and medications  Outcome: Progressing  POC communicated. MRCP pending for today.     Mobility: progressing  Pt is able to ambulate with standby assist    PRN pain meds in use  Problem: Pain - Standard  Goal: Alleviation of pain or a reduction in pain to the patient’s comfort goal  Outcome: Progressing    The patient is Watcher - Medium risk of patient condition declining or worsening    Shift Goals  Clinical Goals: pain control, ambulate and rest  Patient Goals: rest and pain control    Progress made toward(s) clinical / shift goals:  partially.     Patient is not progressing towards the following goals: Pt has yet to ambulate 50 ft post-op. Pt refused due to pain; pt also requesting to rest/sleep tonight. Education reiterated.

## 2021-06-26 NOTE — PROGRESS NOTES
A&Ox4.  Denies N/V/numbness/tingling. Denies chest pain/SOB.  PRN oxycodone available.  Ambulates with standby assist.  Tolerating  diet.  + void. +flatus  BRIAN drain to R quadrant..  SCDs to BLE.

## 2021-06-26 NOTE — PROGRESS NOTES
"Received report from Joanne PLATA.   Pt arrived to unit with transport. Pt states that her pain is \"good.\" Pt more comfortable compared to post-op yesterday.  Ice chips for 4 hours. If  pain tolerable will advance to clears.    "

## 2021-06-27 VITALS
WEIGHT: 155.87 LBS | TEMPERATURE: 97.5 F | RESPIRATION RATE: 18 BRPM | SYSTOLIC BLOOD PRESSURE: 150 MMHG | BODY MASS INDEX: 29.43 KG/M2 | HEIGHT: 61 IN | HEART RATE: 67 BPM | OXYGEN SATURATION: 95 % | DIASTOLIC BLOOD PRESSURE: 70 MMHG

## 2021-06-27 LAB
ALBUMIN SERPL BCP-MCNC: 3.2 G/DL (ref 3.2–4.9)
ALBUMIN/GLOB SERPL: 1 G/DL
ALP SERPL-CCNC: 201 U/L (ref 30–99)
ALT SERPL-CCNC: 69 U/L (ref 2–50)
ANION GAP SERPL CALC-SCNC: 9 MMOL/L (ref 7–16)
AST SERPL-CCNC: 97 U/L (ref 12–45)
BILIRUB SERPL-MCNC: 0.2 MG/DL (ref 0.1–1.5)
BUN SERPL-MCNC: 11 MG/DL (ref 8–22)
CALCIUM SERPL-MCNC: 8.7 MG/DL (ref 8.5–10.5)
CHLORIDE SERPL-SCNC: 109 MMOL/L (ref 96–112)
CO2 SERPL-SCNC: 23 MMOL/L (ref 20–33)
CREAT SERPL-MCNC: 0.63 MG/DL (ref 0.5–1.4)
GLOBULIN SER CALC-MCNC: 3.3 G/DL (ref 1.9–3.5)
GLUCOSE SERPL-MCNC: 161 MG/DL (ref 65–99)
LIPASE SERPL-CCNC: 36 U/L (ref 11–82)
POTASSIUM SERPL-SCNC: 3.7 MMOL/L (ref 3.6–5.5)
PROT SERPL-MCNC: 6.5 G/DL (ref 6–8.2)
SODIUM SERPL-SCNC: 141 MMOL/L (ref 135–145)

## 2021-06-27 PROCEDURE — 36415 COLL VENOUS BLD VENIPUNCTURE: CPT

## 2021-06-27 PROCEDURE — 700105 HCHG RX REV CODE 258: Performed by: HOSPITALIST

## 2021-06-27 PROCEDURE — 83690 ASSAY OF LIPASE: CPT

## 2021-06-27 PROCEDURE — 99239 HOSP IP/OBS DSCHRG MGMT >30: CPT | Performed by: HOSPITALIST

## 2021-06-27 PROCEDURE — 80053 COMPREHEN METABOLIC PANEL: CPT

## 2021-06-27 PROCEDURE — A9270 NON-COVERED ITEM OR SERVICE: HCPCS | Performed by: HOSPITALIST

## 2021-06-27 PROCEDURE — 700102 HCHG RX REV CODE 250 W/ 637 OVERRIDE(OP): Performed by: HOSPITALIST

## 2021-06-27 RX ORDER — OMEPRAZOLE 20 MG/1
20 CAPSULE, DELAYED RELEASE ORAL DAILY
Qty: 30 CAPSULE | Refills: 0 | Status: SHIPPED | OUTPATIENT
Start: 2021-06-28

## 2021-06-27 RX ORDER — LISINOPRIL 20 MG/1
20 TABLET ORAL DAILY
Qty: 30 TABLET | Refills: 0 | Status: SHIPPED | OUTPATIENT
Start: 2021-06-27

## 2021-06-27 RX ADMIN — LEVOTHYROXINE SODIUM 25 MCG: 0.03 TABLET ORAL at 05:36

## 2021-06-27 RX ADMIN — AMLODIPINE BESYLATE 5 MG: 10 TABLET ORAL at 05:36

## 2021-06-27 RX ADMIN — OMEPRAZOLE 20 MG: 20 CAPSULE, DELAYED RELEASE ORAL at 05:36

## 2021-06-27 RX ADMIN — SODIUM CHLORIDE: 9 INJECTION, SOLUTION INTRAVENOUS at 01:25

## 2021-06-27 RX ADMIN — LISINOPRIL 20 MG: 20 TABLET ORAL at 05:36

## 2021-06-27 ASSESSMENT — PAIN DESCRIPTION - PAIN TYPE: TYPE: ACUTE PAIN

## 2021-06-27 ASSESSMENT — ENCOUNTER SYMPTOMS
COUGH: 0
CHILLS: 0
VOMITING: 0
NAUSEA: 0
DIZZINESS: 0
SHORTNESS OF BREATH: 0
HEADACHES: 0
PALPITATIONS: 0
ABDOMINAL PAIN: 1
FEVER: 0

## 2021-06-27 NOTE — DISCHARGE INSTRUCTIONS
"Cholecystitis    Cholecystitis is irritation and swelling (inflammation) of the gallbladder. The gallbladder is an organ that is shaped like a pear. It is under the liver on the right side of the body. This organ stores bile. Bile helps the body break down (digest) the fats in food.  This condition can occur all of a sudden. It needs to be treated.  What are the causes?  This condition may be caused by stones or lumps that form in the gallbladder (gallstones). Gallstones can block the tube (duct) that carries bile out of your gallbladder.  Other causes are:  · Damage to the gallbladder due to less blood flow.  · Germs in the bile ducts.  · Scars or kinks in the bile ducts.  · Abnormal growths (tumors) in the liver, pancreas, or gallbladder.  What increases the risk?  You are more likely to develop this condition if:  · You have sickle cell disease.  · You take birth control pills.   · You use estrogen.  · You have alcoholic liver disease.  · You have liver cirrhosis.  · You are being fed through a vein.  · You are very ill.  · You do not eat or drink for a long time. This is also called \"fasting.\"  · You are overweight (obese).  · You lose weight too fast.  · You are pregnant.  · You have high levels of fat in the blood (triglycerides).  · You have irritation and swelling of the pancreas (pancreatitis).  What are the signs or symptoms?  Symptoms of this condition include:  · Pain in the belly (abdomen). Pain is often in the upper right area of the belly.  · Tenderness or bloating in the belly.  · Feeling sick to your stomach (nauseous).  · Throwing up (vomiting).  · Fever.  · Chills.  How is this diagnosed?  This condition may be diagnosed with a medical history and exam. You may also have other tests, such as:  · Imaging tests. This may include:  ? Ultrasound.  ? CT scan of the belly.  ? Nuclear scan. This is also called a HIDA scan. This scan lets your doctor see the bile as it moves in your body.  ? MRI.  · Blood " tests. These are done to check:  ? Your blood count. The white blood cell count may be higher than normal.  ? How well your liver works.  How is this treated?  This condition may be treated with:  · Surgery to take out your gallbladder.  · Antibiotic medicines to treat illnesses caused by germs.  · Going without food for some time.  · Giving fluids through an IV tube.  · Medicines to treat pain or throwing up.  Follow these instructions at home:  · If you had surgery, follow instructions from your doctor about how to care for yourself after you go home.  Medicines    · Take over-the-counter and prescription medicines only as told by your doctor.  · If you were prescribed an antibiotic medicine, take it as told by your doctor. Do not stop taking it even if you start to feel better.  General instructions  · Follow instructions from your doctor about what to eat or drink. Do not eat or drink anything that makes you sick again.  · Do not lift anything that is heavier than 10 lb (4.5 kg) until your doctor says that it is safe.  · Do not use any products that contain nicotine or tobacco, such as cigarettes and e-cigarettes. If you need help quitting, ask your doctor.  · Keep all follow-up visits as told by your doctor. This is important.  Contact a doctor if:  · You have pain and your medicine does not help.  · You have a fever.  Get help right away if:  · Your pain moves to:  ? Another part of your belly.  ? Your back.  · Your symptoms do not go away.  · You have new symptoms.  Summary  · Cholecystitis is swelling and irritation of the gallbladder.  · This condition may be caused by stones or lumps that form in the gallbladder (gallstones).  · Common symptoms are pain in the belly. You may feel sick to your stomach and start throwing up. You may also have a fever and chills.  · This condition may be treated with surgery to take out the gallbladder. It may also be treated with medicines, fasting, and fluids through an IV  tube.  · Follow what you are told about eating and drinking. Do not eat things that make you sick again.  This information is not intended to replace advice given to you by your health care provider. Make sure you discuss any questions you have with your health care provider.  Document Released: 12/06/2012 Document Revised: 04/26/2019 Document Reviewed: 04/26/2019  [employer] Patient Education © 2020 [employer]    Discharge Instructions    Discharged to home by car with relative. Discharged via wheelchair, hospital escort: Yes.  Special equipment needed: Not Applicable    Be sure to schedule a follow-up appointment with your primary care doctor or any specialists as instructed.      I understand that a diet low in cholesterol, fat, and sodium is recommended for good health. Unless I have been given specific instructions below for another diet, I accept this instruction as my diet prescription.   Other diet: Regular diet     Special Instructions: None    · Is patient discharged on Warfarin / Coumadin?   No     Depression / Suicide Risk    As you are discharged from this [facility] facility, it is important to learn how to keep safe from harming yourself.    Recognize the warning signs:  · Abrupt changes in personality, positive or negative- including increase in energy   · Giving away possessions  · Change in eating patterns- significant weight changes-  positive or negative  · Change in sleeping patterns- unable to sleep or sleeping all the time   · Unwillingness or inability to communicate  · Depression  · Unusual sadness, discouragement and loneliness  · Talk of wanting to die  · Neglect of personal appearance   · Rebelliousness- reckless behavior  · Withdrawal from people/activities they love  · Confusion- inability to concentrate     If you or a loved one observes any of these behaviors or has concerns about self-harm, here's what you can do:  · Talk about it- your feelings and reasons for harming  yourself  · Remove any means that you might use to hurt yourself (examples: pills, rope, extension cords, firearm)  · Get professional help from the community (Mental Health, Substance Abuse, psychological counseling)  · Do not be alone:Call your Safe Contact- someone whom you trust who will be there for you.  · Call your local CRISIS HOTLINE 867-7852 or 048-687-2373  · Call your local Children's Mobile Crisis Response Team [place] (235) 480-2903 or www.[employer]  · Call the toll free National Suicide Prevention Hotlines   · National Suicide Prevention Lifeline 837-503-DBKI (7402)  · National Hope Line Network 800-SUICIDE (906-2851)

## 2021-06-27 NOTE — PROGRESS NOTES
Gastroenterology Progress Note               Author:  TEGAN Coon/Vito Dockery MD Date & Time Created: 6/27/2021 11:34 AM       Patient ID:  Name:             Chawla , Sushma Kumari  YOB: 1960  Age:                 60 y.o.  female  MRN:               [id number]      Referring Provider:  Nabil Deleon MD      Presenting Chief Complaint:  Abdominal pain, Cholelithiasis with choledocholithiasis    Interval History   6/26/2021:ERCP   Impressions:   1.  Choledocholithiasis treated with ERCP, precut needle-knife sphincterotomy, ERCP traction extension sphincterotomy, balloon sweep with 9-12 mm extractor balloon.    6/27/2021: Stable. LFTs stable. No increase in pain. Has pain near surgical BRIAN drain site. Tolerated food this morning    History of Present Illness:    She is a 60-year-old female patient seen in consultation for abdominal pain with findings of cholelithiasis and choledocholithiasis.  She previously saw an urgent care with abdominal pain, ultrasound showing gallbladder disease.  She underwent laparoscopic cholecystectomy by Dr. Abdirashid Restrepo this afternoon.  She was found during surgery to have gallbladder wall thickening and acute inflammation with dilation of the common bile duct.  Intraoperative cholangiogram demonstrated dilated biliary ducts with multiple mid common bile duct filling defects with no contract extravasation.    She is seen in PACU by myself and Dr. Jori choi.  She has just received pain medications and is not very responsive to questioning.  I did speak with her  Michael to obtain further history.  She has a past medical history that includes arthritis, GERD, hypertension.  She has never seen a GI doctor and has never had a screening colonoscopy.  She has undergone laparoscopic hysterectomy in the past.  Current labs reviewed including CBC with hemoglobin 10.1, hematocrit 34.2, MCV 76.  Potassium 3.3, glucose 103, alkaline phosphatase 131, bilirubin  normal.      Review of Systems:  Review of Systems   Constitutional: Negative for chills and fever.   Respiratory: Negative for cough and shortness of breath.    Cardiovascular: Negative for chest pain and palpitations.   Gastrointestinal: Positive for abdominal pain. Negative for nausea and vomiting.   Neurological: Negative for dizziness and headaches.       Sedated      Past Medical History:  Past Medical History:   Diagnosis Date    Arthritis     GERD (gastroesophageal reflux disease)     Hypertension      Active Hospital Problems    Diagnosis     HTN (hypertension) [I10]     Cholecystitis [K81.9]     Arthritis [M19.90]          Past Surgical History:  Past Surgical History:   Procedure Laterality Date    PB ERCP,DIAGNOSTIC N/A 6/26/2021    Procedure: ERCP (ENDOSCOPIC RETROGRADE CHOLANGIOPANCREATOGRAPHY) - WITH STONE REMOVAL;  Surgeon: Jori Crane M.D.;  Location: [facility];  Service: Gastroenterology    PB ERCP,W/REMOVAL STONE,JILLIAN/PANCR DUCTS N/A 6/26/2021    Procedure: ERCP,WITH CALCULUS REMOVAL FROM BILE OR PANCREATIC DUCT;  Surgeon: Jori Crane M.D.;  Location: [facility];  Service: Gastroenterology    GONZALO BY LAPAROSCOPY N/A 6/25/2021    Procedure: CHOLECYSTECTOMY, LAPAROSCOPIC;  Surgeon: Abdirashid Restrepo M.D.;  Location: [facility];  Service: General    CHOLANGIOGRAMS N/A 6/25/2021    Procedure: CHOLANGIOGRAM;  Surgeon: Abdirashid Restrepo M.D.;  Location: [facility];  Service: General    HYSTERECTOMY LAPAROSCOPY             Hospital Medications:  Current Facility-Administered Medications   Medication Dose Frequency Provider Last Rate Last Admin    simethicone (MYLICON) chewable tab 80 mg  80 mg TID PRN Jared Figueroa M.D.   80 mg at 06/26/21 2306    amLODIPine (NORVASC) tablet 5 mg  5 mg Q DAY Fabian Young M.D.   5 mg at 06/27/21 0536    levothyroxine (SYNTHROID) tablet 25 mcg  25 mcg AM ES Fabian Young M.D.   25 mcg at 06/27/21 0536    lisinopril (PRINIVIL)  tablet 20 mg  20 mg DAILY Fabian Young M.D.   20 mg at 06/27/21 0536    verapamil ER (CALAN-SR) tablet 180 mg  180 mg DAILY AT 1800 Fabian Young M.D.   180 mg at 06/26/21 1629    Respiratory Therapy Consult   Continuous RT Fabian Young M.D.        Pharmacy Consult Request ...Pain Management Review 1 Each  1 Each PHARMACY TO DOSE Fabian Young M.D.        oxyCODONE immediate-release (ROXICODONE) tablet 2.5 mg  2.5 mg Q3HRS PRN Fabian Young M.D.   2.5 mg at 06/26/21 1630    Or    oxyCODONE immediate-release (ROXICODONE) tablet 5 mg  5 mg Q3HRS PRN Fabian Young M.D.   5 mg at 06/26/21 0558    Or    morphine (pf) 4 mg/mL injection 2 mg  2 mg Q3HRS PRN Fabian Young M.D.        ondansetron (ZOFRAN) syringe/vial injection 4 mg  4 mg Q4HRS PRSANDRA Young M.D.        ondansetron (ZOFRAN ODT) dispertab 4 mg  4 mg Q4HRS PRSANDRA Young M.D.        promethazine (PHENERGAN) tablet 12.5-25 mg  12.5-25 mg Q4HRS PRSANDRA Young M.D.        promethazine (PHENERGAN) suppository 12.5-25 mg  12.5-25 mg Q4HRS PRSANDRA Young M.D.        prochlorperazine (COMPAZINE) injection 5-10 mg  5-10 mg Q4HRS PRSANDRA Young M.D.        hydrALAZINE (APRESOLINE) injection 10 mg  10 mg Q6HRS PRSANDRA Young M.D.   10 mg at 06/24/21 1948    omeprazole (PRILOSEC) capsule 20 mg  20 mg DAILY Fabian Young M.D.   20 mg at 06/27/21 0536   Last reviewed on 6/25/2021 11:43 AM by Philip Morales M.D.        Current Outpatient Medications:  Medications Prior to Admission   Medication Sig Dispense Refill Last Dose    predniSONE (DELTASONE) 5 MG Tab Take 5 mg by mouth every day.   >3 weeks ago at stopped    simethicone (MYLICON) 80 MG Chew Tab Chew 80 mg every 6 hours as needed for Flatulence.   6/23/2021 at PM    psyllium (METAMUCIL) 58.12 % Pack Take 1 Packet by mouth every day.   >2 days ago at unknown    magnesium hydroxide (MILK OF  MAGNESIA) 400 MG/5ML Suspension Take 30 mL by mouth 1 time a day as needed.   6/23/2021 at PM    naproxen (ALEVE) 220 MG tablet Take 440 mg by mouth one time as needed.   >7 days ago at unknown    pantoprazole (PROTONIX) 40 MG Tablet Delayed Response Take 40 mg by mouth every day.   6/23/2021 at PM    amLODIPine (NORVASC) 5 MG Tab Take 5 mg by mouth every day.   6/23/2021 at PM    methotrexate 2.5 MG Tab Take 7.5 mg by mouth every 7 days.   >3 weeks ago at unknown    levothyroxine (SYNTHROID) 25 MCG Tab Take 25 mcg by mouth Every morning on an empty stomach.   >2 days ago at unknown         Medication Allergies:  Allergies   Allergen Reactions    Atenolol     Darvon [Propoxyphene Hcl]          Family Medical History:  History reviewed. No pertinent family history.      Social History:  Social History     Socioeconomic History    Marital status:      Spouse name: Not on file    Number of children: Not on file    Years of education: Not on file    Highest education level: Not on file   Occupational History    Not on file   Tobacco Use    Smoking status: Never Smoker    Smokeless tobacco: Never Used   Substance and Sexual Activity    Alcohol use: No    Drug use: No    Sexual activity: Not on file   Other Topics Concern    Not on file   Social History Narrative    Not on file     Social Determinants of Health     Financial Resource Strain:     Difficulty of Paying Living Expenses:    Food Insecurity:     Worried About Running Out of Food in the Last Year:     Ran Out of Food in the Last Year:    Transportation Needs:     Lack of Transportation (Medical):     Lack of Transportation (Non-Medical):    Physical Activity:     Days of Exercise per Week:     Minutes of Exercise per Session:    Stress:     Feeling of Stress :    Social Connections:     Frequency of Communication with Friends and Family:     Frequency of Social Gatherings with Friends and Family:     Attends Mandaen Services:     Active Member of Clubs  "or Organizations:     Attends Club or Organization Meetings:     Marital Status:    Intimate Partner Violence:     Fear of Current or Ex-Partner:     Emotionally Abused:     Physically Abused:     Sexually Abused:          Vital signs:  Weight/BMI: Body mass index is 29.45 kg/m².  /70   Pulse 67   Temp 36.4 °C (97.5 °F) (Temporal)   Resp 18   Ht 1.549 m (5' 1\")   Wt 70.7 kg (155 lb 13.8 oz)   SpO2 95%   Vitals:    06/26/21 1536 06/26/21 2002 06/27/21 0402 06/27/21 0745   BP: 140/73 128/63 132/70 150/70   Pulse: 60 69 62 67   Resp: 19 18 18 18   Temp: 36.3 °C (97.4 °F) 36.2 °C (97.2 °F) 36.6 °C (97.8 °F) 36.4 °C (97.5 °F)   TempSrc: Temporal Temporal Temporal Temporal   SpO2: 100% 91% 94% 95%   Weight:       Height:         Oxygen Therapy:  Pulse Oximetry: 95 %, O2 (LPM): 2, O2 Delivery Device: Silicone Nasal Cannula    Intake/Output Summary (Last 24 hours) at 6/27/2021 1134  Last data filed at 6/27/2021 0400  Gross per 24 hour   Intake 700 ml   Output 0 ml   Net 700 ml         Physical Exam:  Physical Exam  Vitals and nursing note reviewed.   Constitutional:       Appearance: Normal appearance.   HENT:      Head: Normocephalic and atraumatic.      Right Ear: External ear normal.      Left Ear: External ear normal.      Nose: Nose normal.      Mouth/Throat:      Mouth: Mucous membranes are dry.      Pharynx: Oropharynx is clear.   Eyes:      Conjunctiva/sclera: Conjunctivae normal.   Cardiovascular:      Rate and Rhythm: Normal rate and regular rhythm.      Pulses: Normal pulses.      Heart sounds: Murmur heard.     Pulmonary:      Effort: Pulmonary effort is normal.      Breath sounds: Normal breath sounds. No wheezing or rales.   Abdominal:      General: Abdomen is flat. Bowel sounds are normal.      Palpations: Abdomen is soft.      Tenderness: There is abdominal tenderness (RUQ).      Comments: Right sided BRIAN Drain   Musculoskeletal:      Cervical back: Neck supple.      Right lower leg: No edema.     "  Left lower leg: No edema.   Lymphadenopathy:      Cervical: No cervical adenopathy.   Skin:     General: Skin is warm and dry.   Neurological:      Mental Status: She is alert.   Psychiatric:         Thought Content: Thought content normal.         Judgment: Judgment normal.           Labs:  Recent Labs     06/24/21 1607 06/25/21 0324 06/26/21 0443   SODIUM 141 141 138   POTASSIUM 4.1 3.3* 3.9   CHLORIDE 107 109 109   CO2 23 21 21   BUN 4* 4* 7*   CREATININE 0.72 0.57 0.63   MAGNESIUM 2.4  --   --    CALCIUM 9.7 9.1 8.7     Recent Labs     06/24/21 1607 06/25/21 0324 06/26/21  0443   ALTSGPT 21 18 29   ASTSGOT 28 25 57*   ALKPHOSPHAT 149* 131* 117*   TBILIRUBIN 0.2 0.3 0.2   LIPASE 32  --   --    GLUCOSE 124* 103* 147*     Recent Labs     06/24/21 1607 06/25/21 0324 06/26/21  0443   WBC 5.0 4.8 10.3   NEUTSPOLYS 69.10  --   --    LYMPHOCYTES 22.00  --   --    MONOCYTES 6.70  --   --    EOSINOPHILS 0.80  --   --    BASOPHILS 0.80  --   --    ASTSGOT 28 25 57*   ALTSGPT 21 18 29   ALKPHOSPHAT 149* 131* 117*   TBILIRUBIN 0.2 0.3 0.2     Recent Labs     06/24/21 1607 06/25/21 0324 06/26/21  0443   RBC 4.62 4.50 4.06*   HEMOGLOBIN 10.4* 10.1* 9.2*   HEMATOCRIT 35.6* 34.2* 30.8*   PLATELETCT 555* 503* 498*   PROTHROMBTM  --   --  14.7*   INR  --   --  1.19*     No results found for this or any previous visit (from the past 24 hour(s)).      Radiology Review:  DX-PORTABLE FLUORO > 1 HOUR   Final Result      Intraoperative fluoroscopic spot images as described above.      [id number]   Final Result      9 seconds fluoroscopy time utilized for intraoperative cholangiogram.            MDM (Data Review):   -Records reviewed and summarized in current documentation  -I personally reviewed and interpreted the laboratory results  -I personally reviewed the radiology images      Medical Decision Making, by Problem:  Active Hospital Problems    Diagnosis     HTN (hypertension) [I10]     Cholecystitis [K81.9]      Arthritis [M19.90]            Assessment/Recommendations:  Assessment:  1.  Cholecystitis with cholelithiasis status post laparoscopic cholecystectomy with findings of cholangiogram consistent with choledocholithiasis s/p ERCP with stone and sludge removal  2.  Upper abdominal pain  3.  Elevated alkaline phosphatase    Plan:  1.  Advance diet as tolerated  2. Trend LFTs  3. If worsening pain consider re-check lipase and CT scan  Follow up outpatient with [facility] as needed as there are no further interventions needed from our standpoint this hospitalization at this time. She may follow up in out outpatient clinic for screening colonoscopy consideration if she has not already had one     **  GI WILL SIGN OFF / STAND BY - PLEASE CALL IF ANY CONCERNS  **    Thank you very much for allowing me to participate in the care of your patient.  Please feel free to contact me anytime at [phone].     GIOVANA Coon.    Core Quality Measures   Reviewed items::  Labs, Medications and Radiology reports reviewed  ===  I have seen and examined the patient today.  I have reviewed the medical record, laboratory data, imaging, and all relevant studies.  I have discussed the assessment and plan of care with Yoseph ANDERSON and agree with their note and plan of care as documented.   Markus Dockery M.D.

## 2021-06-27 NOTE — PROGRESS NOTES
Report received, patient resting in bed, family at bedside.   Alert and oriented x4. Denies pain, shortness of breath or nausea.   Patient with BRIAN drain in place and lap sites. Patient reports voiding and passing gas okay.   Call light and belongings within reach, hourly rounding in place.     0900: advance patient diet per order, patient tolerate bland easy to chew diet.

## 2021-06-27 NOTE — PROGRESS NOTES
"    DATE: 6/27/2021    Post Operative Day  2 laparoscopic cholecystectomy.    Interval Events:  ERCP completed.    PHYSICAL EXAMINATION:  Vital Signs: /70   Pulse 67   Temp 36.4 °C (97.5 °F) (Temporal)   Resp 18   Ht 1.549 m (5' 1\")   Wt 70.7 kg (155 lb 13.8 oz)   SpO2 95%     The abdomen is diffusely tender in the RUQ and at the port sites.  RUQ drain with minimal non bilious drainage.    Laboratory Values:   Recent Labs     06/24/21  1607 06/25/21  0324 06/26/21  0443   WBC 5.0 4.8 10.3   RBC 4.62 4.50 4.06*   HEMOGLOBIN 10.4* 10.1* 9.2*   HEMATOCRIT 35.6* 34.2* 30.8*   MCV 77.1* 76.0* 75.9*   MCH 22.5* 22.4* 22.7*   MCHC 29.2* 29.5* 29.9*   RDW 56.9* 55.9* 55.2*   PLATELETCT 555* 503* 498*   MPV 10.2 9.9 9.9     Recent Labs     06/24/21  1607 06/25/21  0324 06/26/21  0443   SODIUM 141 141 138   POTASSIUM 4.1 3.3* 3.9   CHLORIDE 107 109 109   CO2 23 21 21   GLUCOSE 124* 103* 147*   BUN 4* 4* 7*   CREATININE 0.72 0.57 0.63   CALCIUM 9.7 9.1 8.7     Recent Labs     06/24/21  1607 06/25/21  0324 06/26/21  0443   ASTSGOT 28 25 57*   ALTSGPT 21 18 29   TBILIRUBIN 0.2 0.3 0.2   ALKPHOSPHAT 149* 131* 117*   GLOBULIN 4.0* 3.6* 3.2   INR  --   --  1.19*       IASSESSMENT AND PLAN:     Continued progress.  Remove RUQ drain.  Stable for discharge from a Surgery perspective.     ____________________________________     Abdirashid Restrepo M.D.    DD: 6/27/2021  8:39 AM      "

## 2021-06-27 NOTE — CARE PLAN
The patient is Stable - Low risk of patient condition declining or worsening    Shift Goals  Clinical Goals: sleep hygiene  Patient Goals: rest and pain control    Progress made toward(s) clinical / shift goals:      Problem: Knowledge Deficit - Standard  Goal: Patient and family/care givers will demonstrate understanding of plan of care, disease process/condition, diagnostic tests and medications  Outcome: Progressing     Problem: Pain - Standard  Goal: Alleviation of pain or a reduction in pain to the patient’s comfort goal  Outcome: Progressing

## 2021-06-27 NOTE — PROGRESS NOTES
Bedside report received, pt resting comfortably in bed. Family at bedside. Assumed care at 1900    Neuro - A/O x4, SBA for ambulation.  Cardiac- mild edema in BLE  Resp - intact  GI - waiting on BM, abd pain. Pt had gas pain overnight, obtained an order for simethicone PRN. Pt satisfied with intervention.   - +voiding, no pain or burning on urination.   Skin - lap sites x4, BRIAN drain in place CDI    POC discussed with pt, pt and family agreeable. Call light and side table within reach. Hourly rounding in place.

## 2021-06-27 NOTE — PROGRESS NOTES
This RN went over discharge instructions with patient, all questions answered.   All belongings accounted for, patient escorted by staff down and  by family.

## 2021-06-27 NOTE — DISCHARGE SUMMARY
Discharge Summary    CHIEF COMPLAINT ON ADMISSION  Chief Complaint   Patient presents with   • Sent by MD     seen by PCP, u/s completed and found that she has gallbladder disease.    • Abdominal Pain     started 6/10 and has improved since being seen at , pt has been eating very much.         Reason for Admission  Sent by MD     Admission Date  6/24/2021    CODE STATUS  Full Code    HPI & HOSPITAL COURSE    60-year-old female with past medical history of hypertension and rheumatoid arthritis presenting with abdominal pain.  She was found to have calculus cholecystitis on ultrasound.  She underwent laparoscopic cholecystectomy with intraoperative cholangiogram on 6/25 which revealed choledocholithiasis.  GI has been consulted and she underwent ERCP on 6/26 which showed choledocholithiasis and patient had sphincterotomy with ERCP traction extension sphincterotomy and balloon sweep.  Her abdominal pain has markedly improved.  On day of discharge her LFTs have slightly trended up but I discussed this with GI on day of discharge and given normal lipase patient is stable for discharge.  She is tolerating diet and her abdominal pain has nearly resolved.  Her biliary drain  has been removed prior to discharge..      Therefore, she is discharged in good and stable condition to home with close outpatient follow-up.    The patient met 2-midnight criteria for an inpatient stay at the time of discharge.    Discharge Date  6/27/21    FOLLOW UP ITEMS POST DISCHARGE  none    DISCHARGE DIAGNOSES  Active Problems:    HTN (hypertension) POA: Yes    Cholecystitis POA: Yes    Arthritis POA: Yes  Resolved Problems:    * No resolved hospital problems. *      FOLLOW UP  No future appointments.  Abdirashid Restrepo M.D.  [address] [id number] [phone]      As needed    Jori Crane M.D.  [address] [place] [id number]  [phone]    Schedule an appointment as soon as possible for a visit in 2  months        MEDICATIONS ON DISCHARGE     Medication List      START taking these medications      Instructions   omeprazole 20 MG delayed-release capsule  Start taking on: June 28, 2021  Commonly known as: PRILOSEC   Take 1 capsule by mouth every day.  Dose: 20 mg        CONTINUE taking these medications      Instructions   Aleve 220 MG tablet  Generic drug: naproxen   Take 440 mg by mouth one time as needed.  Dose: 440 mg     amLODIPine 5 MG Tabs  Commonly known as: NORVASC   Take 5 mg by mouth every day.  Dose: 5 mg     levothyroxine 25 MCG Tabs  Commonly known as: SYNTHROID   Take 25 mcg by mouth Every morning on an empty stomach.  Dose: 25 mcg     lisinopril 20 MG Tabs  Commonly known as: PRINIVIL   Take 1 tablet by mouth every day.  Dose: 20 mg     magnesium hydroxide 400 MG/5ML Susp  Commonly known as: MILK OF MAGNESIA   Take 30 mL by mouth 1 time a day as needed.  Dose: 30 mL     methotrexate 2.5 MG Tabs   Take 7.5 mg by mouth every 7 days.  Dose: 7.5 mg     pantoprazole 40 MG Tbec  Commonly known as: PROTONIX   Take 40 mg by mouth every day.  Dose: 40 mg     predniSONE 5 MG Tabs  Commonly known as: DELTASONE   Take 5 mg by mouth every day.  Dose: 5 mg     psyllium 58.12 % Pack  Commonly known as: METAMUCIL   Take 1 Packet by mouth every day.  Dose: 1 Packet     simethicone 80 MG Chew  Commonly known as: MYLICON   Chew 80 mg every 6 hours as needed for Flatulence.  Dose: 80 mg     verapamil 180 MG CR tablet  Commonly known as: COVERA HS   Take 1 tablet by mouth every day.  Dose: 180 mg            Allergies  Allergies   Allergen Reactions   • Atenolol    • Darvon [Propoxyphene Hcl]        DIET  Orders Placed This Encounter   Procedures   • Diet Order Diet: Level 7 - Easy to Chew (Vegetarian soup); Liquid level: Level 0 - Thin     Standing Status:   Standing     Number of Occurrences:   1     Order Specific Question:   Diet:     Answer:   Level 7 - Easy to Chew [22]     Comments:   Vegetarian soup     Order  Specific Question:   Liquid level     Answer:   Level 0 - Thin       ACTIVITY  As tolerated.  Weight bearing as tolerated    CONSULTATIONS  General surgery  GI    PROCEDURES  Lap kaylah  ERCP    LABORATORY  Lab Results   Component Value Date    SODIUM 141 06/27/2021    POTASSIUM 3.7 06/27/2021    CHLORIDE 109 06/27/2021    CO2 23 06/27/2021    GLUCOSE 161 (H) 06/27/2021    BUN 11 06/27/2021    CREATININE 0.63 06/27/2021        Lab Results   Component Value Date    WBC 10.3 06/26/2021    HEMOGLOBIN 9.2 (L) 06/26/2021    HEMATOCRIT 30.8 (L) 06/26/2021    PLATELETCT 498 (H) 06/26/2021        Total time of the discharge process exceeds 38 minutes.

## 2024-08-22 ENCOUNTER — TELEPHONE (OUTPATIENT)
Dept: NEUROLOGY | Facility: MEDICAL CENTER | Age: 64
End: 2024-08-22

## 2024-08-22 NOTE — TELEPHONE ENCOUNTER
Received a refill for medication. Not a current patient and advised patient that she needs to get a referral to see Dr. Crum   ~Demetrice BURNETT

## 2024-10-09 ENCOUNTER — OFFICE VISIT (OUTPATIENT)
Dept: RHEUMATOLOGY | Facility: MEDICAL CENTER | Age: 64
End: 2024-10-09
Attending: STUDENT IN AN ORGANIZED HEALTH CARE EDUCATION/TRAINING PROGRAM

## 2024-10-09 ENCOUNTER — HOSPITAL ENCOUNTER (OUTPATIENT)
Dept: LAB | Facility: MEDICAL CENTER | Age: 64
End: 2024-10-09
Attending: STUDENT IN AN ORGANIZED HEALTH CARE EDUCATION/TRAINING PROGRAM

## 2024-10-09 VITALS
HEIGHT: 61 IN | SYSTOLIC BLOOD PRESSURE: 140 MMHG | BODY MASS INDEX: 29.15 KG/M2 | DIASTOLIC BLOOD PRESSURE: 86 MMHG | OXYGEN SATURATION: 97 % | WEIGHT: 154.38 LBS | TEMPERATURE: 97.5 F | HEART RATE: 89 BPM

## 2024-10-09 DIAGNOSIS — D84.9 IMMUNOSUPPRESSED STATUS (HCC): ICD-10-CM

## 2024-10-09 DIAGNOSIS — M17.0 PRIMARY OSTEOARTHRITIS OF BOTH KNEES: ICD-10-CM

## 2024-10-09 DIAGNOSIS — G60.8 PERIPHERAL SENSORY NEUROPATHY: ICD-10-CM

## 2024-10-09 DIAGNOSIS — R21 RASH AND NONSPECIFIC SKIN ERUPTION: ICD-10-CM

## 2024-10-09 DIAGNOSIS — R76.8 SEROLOGIC AUTOIMMUNITY: ICD-10-CM

## 2024-10-09 PROBLEM — G62.9 PERIPHERAL POLYNEUROPATHY: Status: ACTIVE | Noted: 2024-10-09

## 2024-10-09 LAB
ALBUMIN SERPL BCP-MCNC: 4.1 G/DL (ref 3.2–4.9)
ALBUMIN/GLOB SERPL: 1.3 G/DL
ALP SERPL-CCNC: 106 U/L (ref 30–99)
ALT SERPL-CCNC: 15 U/L (ref 2–50)
ANION GAP SERPL CALC-SCNC: 13 MMOL/L (ref 7–16)
ANISOCYTOSIS BLD QL SMEAR: ABNORMAL
AST SERPL-CCNC: 16 U/L (ref 12–45)
BASOPHILS # BLD AUTO: 0.9 % (ref 0–1.8)
BASOPHILS # BLD: 0.07 K/UL (ref 0–0.12)
BILIRUB SERPL-MCNC: 0.2 MG/DL (ref 0.1–1.5)
BUN SERPL-MCNC: 11 MG/DL (ref 8–22)
CALCIUM ALBUM COR SERPL-MCNC: 9.9 MG/DL (ref 8.5–10.5)
CALCIUM SERPL-MCNC: 10 MG/DL (ref 8.5–10.5)
CHLORIDE SERPL-SCNC: 100 MMOL/L (ref 96–112)
CO2 SERPL-SCNC: 22 MMOL/L (ref 20–33)
COMMENT 1642: NORMAL
CREAT SERPL-MCNC: 0.87 MG/DL (ref 0.5–1.4)
CRP SERPL HS-MCNC: <0.3 MG/DL (ref 0–0.75)
EOSINOPHIL # BLD AUTO: 0.22 K/UL (ref 0–0.51)
EOSINOPHIL NFR BLD: 2.9 % (ref 0–6.9)
ERYTHROCYTE [DISTWIDTH] IN BLOOD BY AUTOMATED COUNT: 54.4 FL (ref 35.9–50)
ERYTHROCYTE [SEDIMENTATION RATE] IN BLOOD BY WESTERGREN METHOD: 25 MM/HOUR (ref 0–25)
GFR SERPLBLD CREATININE-BSD FMLA CKD-EPI: 75 ML/MIN/1.73 M 2
GLOBULIN SER CALC-MCNC: 3.2 G/DL (ref 1.9–3.5)
GLUCOSE SERPL-MCNC: 91 MG/DL (ref 65–99)
HCT VFR BLD AUTO: 30.2 % (ref 37–47)
HGB BLD-MCNC: 8.5 G/DL (ref 12–16)
HYPOCHROMIA BLD QL SMEAR: ABNORMAL
IMM GRANULOCYTES # BLD AUTO: 0.02 K/UL (ref 0–0.11)
IMM GRANULOCYTES NFR BLD AUTO: 0.3 % (ref 0–0.9)
LYMPHOCYTES # BLD AUTO: 1.42 K/UL (ref 1–4.8)
LYMPHOCYTES NFR BLD: 18.9 % (ref 22–41)
MACROCYTES BLD QL SMEAR: ABNORMAL
MCH RBC QN AUTO: 19.5 PG (ref 27–33)
MCHC RBC AUTO-ENTMCNC: 28.1 G/DL (ref 32.2–35.5)
MCV RBC AUTO: 69.3 FL (ref 81.4–97.8)
MICROCYTES BLD QL SMEAR: ABNORMAL
MONOCYTES # BLD AUTO: 0.48 K/UL (ref 0–0.85)
MONOCYTES NFR BLD AUTO: 6.4 % (ref 0–13.4)
MORPHOLOGY BLD-IMP: NORMAL
NEUTROPHILS # BLD AUTO: 5.3 K/UL (ref 1.82–7.42)
NEUTROPHILS NFR BLD: 70.6 % (ref 44–72)
NRBC # BLD AUTO: 0 K/UL
NRBC BLD-RTO: 0 /100 WBC (ref 0–0.2)
PLATELET # BLD AUTO: 392 K/UL (ref 164–446)
PLATELET BLD QL SMEAR: NORMAL
PMV BLD AUTO: 10.4 FL (ref 9–12.9)
POIKILOCYTOSIS BLD QL SMEAR: NORMAL
POLYCHROMASIA BLD QL SMEAR: NORMAL
POTASSIUM SERPL-SCNC: 4.2 MMOL/L (ref 3.6–5.5)
PROT SERPL-MCNC: 7.3 G/DL (ref 6–8.2)
RBC # BLD AUTO: 4.36 M/UL (ref 4.2–5.4)
RBC BLD AUTO: PRESENT
SODIUM SERPL-SCNC: 135 MMOL/L (ref 135–145)
TARGETS BLD QL SMEAR: NORMAL
WBC # BLD AUTO: 7.5 K/UL (ref 4.8–10.8)

## 2024-10-09 PROCEDURE — 86200 CCP ANTIBODY: CPT

## 2024-10-09 PROCEDURE — 86140 C-REACTIVE PROTEIN: CPT

## 2024-10-09 PROCEDURE — 85652 RBC SED RATE AUTOMATED: CPT

## 2024-10-09 PROCEDURE — 86431 RHEUMATOID FACTOR QUANT: CPT

## 2024-10-09 PROCEDURE — 86039 ANTINUCLEAR ANTIBODIES (ANA): CPT

## 2024-10-09 PROCEDURE — 3079F DIAST BP 80-89 MM HG: CPT | Performed by: STUDENT IN AN ORGANIZED HEALTH CARE EDUCATION/TRAINING PROGRAM

## 2024-10-09 PROCEDURE — 3077F SYST BP >= 140 MM HG: CPT | Performed by: STUDENT IN AN ORGANIZED HEALTH CARE EDUCATION/TRAINING PROGRAM

## 2024-10-09 PROCEDURE — 36415 COLL VENOUS BLD VENIPUNCTURE: CPT

## 2024-10-09 PROCEDURE — 99204 OFFICE O/P NEW MOD 45 MIN: CPT | Performed by: STUDENT IN AN ORGANIZED HEALTH CARE EDUCATION/TRAINING PROGRAM

## 2024-10-09 PROCEDURE — 86225 DNA ANTIBODY NATIVE: CPT

## 2024-10-09 PROCEDURE — 80053 COMPREHEN METABOLIC PANEL: CPT

## 2024-10-09 PROCEDURE — 86036 ANCA SCREEN EACH ANTIBODY: CPT

## 2024-10-09 PROCEDURE — 83516 IMMUNOASSAY NONANTIBODY: CPT | Mod: 91

## 2024-10-09 PROCEDURE — 99212 OFFICE O/P EST SF 10 MIN: CPT | Performed by: STUDENT IN AN ORGANIZED HEALTH CARE EDUCATION/TRAINING PROGRAM

## 2024-10-09 PROCEDURE — 86235 NUCLEAR ANTIGEN ANTIBODY: CPT

## 2024-10-09 PROCEDURE — 85025 COMPLETE CBC W/AUTO DIFF WBC: CPT

## 2024-10-09 PROCEDURE — 82164 ANGIOTENSIN I ENZYME TEST: CPT

## 2024-10-09 RX ORDER — ERGOCALCIFEROL 1.25 MG/1
CAPSULE, LIQUID FILLED ORAL
COMMUNITY

## 2024-10-09 RX ORDER — LEVOCETIRIZINE DIHYDROCHLORIDE 5 MG/1
TABLET, FILM COATED ORAL
COMMUNITY

## 2024-10-09 RX ORDER — GABAPENTIN 100 MG/1
100 CAPSULE ORAL 2 TIMES DAILY
COMMUNITY

## 2024-10-09 ASSESSMENT — PATIENT HEALTH QUESTIONNAIRE - PHQ9: CLINICAL INTERPRETATION OF PHQ2 SCORE: 0

## 2024-10-11 LAB
ACE SERPL-CCNC: 59 U/L (ref 16–85)
ANA PAT SER IF-IMP: ABNORMAL
ANA PAT SER IF-IMP: ABNORMAL
NUCLEAR IGG SER QL IF: DETECTED
NUCLEAR IGG TITR SER IF: ABNORMAL {TITER}

## 2024-10-12 LAB
ANCA IFA PATTERN Q6048: NORMAL
ANCA IFA TITER Q6047: NORMAL
DSDNA AB TITR SER CLIF: NORMAL {TITER}
MYELOPEROXIDASE AB SER-ACNC: 0 AU/ML (ref 0–19)
PROTEINASE3 AB SER-ACNC: 5 AU/ML (ref 0–19)

## 2024-10-13 LAB
CARBAMYLATED PROTEIN ANTIBODY, IGG Q6043: 12 UNITS (ref 0–19)
CCP IGA+IGG SERPL IA-ACNC: >250 UNITS (ref 0–19)
RHEUMATOID FACT SER NEPH-ACNC: 280 IU/ML (ref 0–14)

## 2024-10-14 LAB — U1 SNRNP IGG SER QL: 4 UNITS (ref 0–19)

## 2024-10-15 LAB
CHROMATIN IGG SERPL-ACNC: 27 UNITS (ref 0–19)
ENA SCL70 IGG SER QL: 1 AU/ML (ref 0–40)
ENA SM IGG SER-ACNC: 2 AU/ML (ref 0–40)
ENA SS-A 60KD AB SER-ACNC: 0 AU/ML (ref 0–40)
ENA SS-A IGG SER QL: 1 AU/ML (ref 0–40)
ENA SS-B IGG SER IA-ACNC: 0 AU/ML (ref 0–40)

## (undated) DEVICE — SET EXTENSION WITH 2 PORTS (48EA/CA) ***PART #2C8610 IS A SUBSTITUTE*****

## (undated) DEVICE — SET SUCTION/IRRIGATION WITH DISPOSABLE TIP (6/CA )PART #0250-070-520 IS A SUB

## (undated) DEVICE — SUTURE 4-0 VICRYL PLUS FS-2 - 27 INCH (36/BX)

## (undated) DEVICE — SET LEADWIRE 5 LEAD BEDSIDE DISPOSABLE ECG (1SET OF 5/EA)

## (undated) DEVICE — GLOVE BIOGEL SZ 7.5 SURGICAL PF LTX - (50PR/BX 4BX/CA)

## (undated) DEVICE — CANNULA W/SEAL 5X100 Z-THRE - ADED KII (12/BX)

## (undated) DEVICE — TROCAR 5X100 BLADED Z-THREAD - KII (6/BX)

## (undated) DEVICE — KIT CUSTOM PROCEDURE SINGLE FOR ENDO  (15/CA)

## (undated) DEVICE — CANISTER SUCTION RIGID RED 1500CC (40EA/CA)

## (undated) DEVICE — SUTURE 0 COATED VICRYL 6-18IN - (12PK/BX)

## (undated) DEVICE — SUCTION INSTRUMENT YANKAUER BULBOUS TIP W/O VENT (50EA/CA)

## (undated) DEVICE — TUBING INSUFFLATION PNEUMOCLEAR HIGH-FLOW (10EA/BX)

## (undated) DEVICE — MICROKNIFE XL

## (undated) DEVICE — SPONGE GAUZE NON-STERILE 4X4 - (2000/CA 10PK/CA)

## (undated) DEVICE — CHLORAPREP 26 ML APPLICATOR - ORANGE TINT(25/CA)

## (undated) DEVICE — KIT ANESTHESIA W/CIRCUIT & 3/LT BAG W/FILTER (20EA/CA)

## (undated) DEVICE — ELECTRODE DUAL RETURN W/ CORD - (50/PK)

## (undated) DEVICE — CANISTER SUCTION 3000ML MECHANICAL FILTER AUTO SHUTOFF MEDI-VAC NONSTERILE LF DISP  (40EA/CA)

## (undated) DEVICE — GOWN WARMING STANDARD FLEX - (30/CA)

## (undated) DEVICE — TROCAR Z THREAD11MM OPTICAL - NON BLADED(6/BX)

## (undated) DEVICE — ENDO PEANUT 5MM DEVICE (12EA/BX)

## (undated) DEVICE — FILM CASSETTE ENDO

## (undated) DEVICE — SENSOR SPO2 NEO LNCS ADHESIVE (20/BX) SEE USER NOTES

## (undated) DEVICE — CATHETER REDDICK ----MUST ORDER IN MULITPLES OF 10----

## (undated) DEVICE — BITE BLOCK ADULT 60FR (100EA/CA)

## (undated) DEVICE — CATHETER CHOLANGIOGRAM TAUT - (10/BX)

## (undated) DEVICE — GLOVE SZ 6.5 BIOGEL PI MICRO - PF LF (50PR/BX)

## (undated) DEVICE — BALLOON RETRIEVAL EXTRACTOR PRO RX   9-12MM

## (undated) DEVICE — GLOVE SZ 6 BIOGEL PI MICRO - PF LF (50PR/BX 4BX/CA)

## (undated) DEVICE — TUBE CONNECTING SUCTION - CLEAR PLASTIC STERILE 72 IN (50EA/CA)

## (undated) DEVICE — BANDAID SHEER STRIP 3/4 IN (100EA/BX 12BX/CA)

## (undated) DEVICE — SET EXTENSION 31IN APPX 3.2ML WITH CLAMP (50/CA)WAS 4610-03

## (undated) DEVICE — TOWEL STOP TIMEOUT SAFETY FLAG (40EA/CA)

## (undated) DEVICE — SUTURE GENERAL

## (undated) DEVICE — SET TUBING PNEUMOCLEAR HIGH FLOW SMOKE EVACUATION (10EA/BX)

## (undated) DEVICE — ELECTRODE 850 FOAM ADHESIVE - HYDROGEL RADIOTRNSPRNT (50/PK)

## (undated) DEVICE — STOPCOCK MALE 4-WAY - (50/CA)

## (undated) DEVICE — BANDAID X-LARGE 2 X 4 IN LF (50EA/BX)

## (undated) DEVICE — MASK ANESTHESIA ADULT  - (100/CA)

## (undated) DEVICE — TUBING CLEARLINK DUO-VENT - C-FLO (48EA/CA)

## (undated) DEVICE — RESERVOIR SUCTION 100 CC - SILICONE (20EA/CA)

## (undated) DEVICE — CONTAINER, SPECIMEN, STERILE

## (undated) DEVICE — APPICATOR HEMOSTAT ARISTA XL - FLEXITIP (10EA/CA)

## (undated) DEVICE — SODIUM CHL IRRIGATION 0.9% 1000ML (12EA/CA)

## (undated) DEVICE — GLOVE BIOGEL INDICATOR SZ 7.5 SURGICAL PF LTX - (50PR/BX 4BX/CA)

## (undated) DEVICE — DEVICE BIOPSY RX BILIARY SYSTEM CAP (10EA/BX)

## (undated) DEVICE — LACTATED RINGERS INJ 1000 ML - (14EA/CA 60CA/PF)

## (undated) DEVICE — HEMOSTAT ARISTA PWD 5 GRAM - (5/BX)

## (undated) DEVICE — PROTECTOR ULNA NERVE - (36PR/CA)

## (undated) DEVICE — PACK LAP CHOLE OR - (2EA/CA)

## (undated) DEVICE — NEPTUNE 4 PORT MANIFOLD - (20/PK)

## (undated) DEVICE — HEAD HOLDER JUNIOR/ADULT

## (undated) DEVICE — SUTURE 2-0 ETHILON FS - (36/BX) 18 INCH

## (undated) DEVICE — Device

## (undated) DEVICE — TUBE CONNECT SUCTION CLEAR 120 X 1/4" (50EA/CA)"

## (undated) DEVICE — TUBE E-T HI-LO CUFF 6.5MM (10EA/BX)